# Patient Record
Sex: MALE | Race: WHITE | HISPANIC OR LATINO | Employment: PART TIME | URBAN - METROPOLITAN AREA
[De-identification: names, ages, dates, MRNs, and addresses within clinical notes are randomized per-mention and may not be internally consistent; named-entity substitution may affect disease eponyms.]

---

## 2017-01-05 ENCOUNTER — HOSPITAL ENCOUNTER (EMERGENCY)
Facility: CLINIC | Age: 42
Discharge: HOME | End: 2017-01-06
Attending: EMERGENCY MEDICINE | Admitting: EMERGENCY MEDICINE
Payer: COMMERCIAL

## 2017-01-05 DIAGNOSIS — G51.0 BELL'S PALSY: ICD-10-CM

## 2017-01-05 LAB
ERYTHROCYTE [DISTWIDTH] IN BLOOD BY AUTOMATED COUNT: 12.7 % (ref 10–15)
HCT VFR BLD AUTO: 44 % (ref 40–53)
HGB BLD-MCNC: 15.9 G/DL (ref 13.3–17.7)
MCH RBC QN AUTO: 30.6 PG (ref 26.5–33)
MCHC RBC AUTO-ENTMCNC: 36.1 G/DL (ref 31.5–36.5)
MCV RBC AUTO: 85 FL (ref 78–100)
PLATELET # BLD AUTO: 223 10E9/L (ref 150–450)
RBC # BLD AUTO: 5.19 10E12/L (ref 4.4–5.9)
WBC # BLD AUTO: 7.8 10E9/L (ref 4–11)

## 2017-01-05 PROCEDURE — 93005 ELECTROCARDIOGRAM TRACING: CPT

## 2017-01-05 PROCEDURE — 85027 COMPLETE CBC AUTOMATED: CPT | Performed by: EMERGENCY MEDICINE

## 2017-01-05 PROCEDURE — 80048 BASIC METABOLIC PNL TOTAL CA: CPT | Performed by: EMERGENCY MEDICINE

## 2017-01-05 PROCEDURE — 99285 EMERGENCY DEPT VISIT HI MDM: CPT | Mod: 25

## 2017-01-05 PROCEDURE — 25000125 ZZHC RX 250: Performed by: EMERGENCY MEDICINE

## 2017-01-05 PROCEDURE — 96361 HYDRATE IV INFUSION ADD-ON: CPT

## 2017-01-05 PROCEDURE — 25000128 H RX IP 250 OP 636: Performed by: EMERGENCY MEDICINE

## 2017-01-05 PROCEDURE — 96375 TX/PRO/DX INJ NEW DRUG ADDON: CPT

## 2017-01-05 PROCEDURE — 96374 THER/PROPH/DIAG INJ IV PUSH: CPT | Mod: 59

## 2017-01-05 RX ORDER — DIPHENHYDRAMINE HYDROCHLORIDE 50 MG/ML
25 INJECTION INTRAMUSCULAR; INTRAVENOUS ONCE
Status: COMPLETED | OUTPATIENT
Start: 2017-01-05 | End: 2017-01-05

## 2017-01-05 RX ORDER — METOCLOPRAMIDE HYDROCHLORIDE 5 MG/ML
10 INJECTION INTRAMUSCULAR; INTRAVENOUS ONCE
Status: COMPLETED | OUTPATIENT
Start: 2017-01-05 | End: 2017-01-05

## 2017-01-05 RX ADMIN — METOCLOPRAMIDE 10 MG: 5 INJECTION, SOLUTION INTRAMUSCULAR; INTRAVENOUS at 23:55

## 2017-01-05 RX ADMIN — DIPHENHYDRAMINE HYDROCHLORIDE 25 MG: 50 INJECTION, SOLUTION INTRAMUSCULAR; INTRAVENOUS at 23:55

## 2017-01-05 RX ADMIN — SODIUM CHLORIDE 500 ML: 9 INJECTION, SOLUTION INTRAVENOUS at 23:47

## 2017-01-05 ASSESSMENT — ENCOUNTER SYMPTOMS
PHOTOPHOBIA: 0
VOMITING: 0
NAUSEA: 0
SHORTNESS OF BREATH: 0
CONFUSION: 0
FACIAL ASYMMETRY: 1
HEADACHES: 1
FEVER: 0
SPEECH DIFFICULTY: 0
LIGHT-HEADEDNESS: 0

## 2017-01-05 NOTE — ED AVS SNAPSHOT
Emergency Department    [address] [place] [address]    Phone:  [phone]    Fax:  [phone]                                       Lars Daley   MRN: [id number]    Department:   Emergency Department   Date of Visit:  1/5/2017           After Visit Summary Signature Page     I have received my discharge instructions, and my questions have been answered. I have discussed any challenges I see with this plan with the nurse or doctor.    ..........................................................................................................................................  Patient/Patient Representative Signature      ..........................................................................................................................................  Patient Representative Print Name and Relationship to Patient    ..................................................               ................................................  Date                                            Time    ..........................................................................................................................................  Reviewed by Signature/Title    ...................................................              ..............................................  Date                                                            Time

## 2017-01-05 NOTE — ED AVS SNAPSHOT
Emergency Department    [address] [facility] [address]    Phone:  [phone]    Fax:  [phone]                                       Lars Daley   MRN: [id number]    Department:   Emergency Department   Date of Visit:  1/5/2017           Patient Information     Date Of Birth          1975        Your diagnoses for this visit were:     Bell's palsy        You were seen by Radha Jacobs MD.      Follow-up Information     Schedule an appointment as soon as possible for a visit with Neurology, [facility].    Contact information:    [address]  [address]  [facility] [id number] [phone]          Follow up with  Emergency Department.    Specialty:  EMERGENCY MEDICINE    Why:  If symptoms worsen    Contact information:    [address] [facility] [address] [phone]        Discharge Instructions       There was no stroke, tumor or bleeding on your MRI  Start taking the prednisone and acyclovir as prescribed till they are gone  Recommend following up with neurology about this. This will hopefully go away with time  Your blood sugars may increase while on the prednisone. You can take an extra 500 mg of metformin per day so total of 2500 mg per day (you are taking I think 2000 mg per day). If your blood sugars are still high, call your primary care provider for further adjustments  Use artificial tears on the right to keep it moist  You may need to tape eye shut or put patch on right eye to avoid your eye getting scratched if you find that your right eyelid can't shut all of the way    Parálisis De Wick [Wick's Palsy]  La parálisis de Wick es un problema que afecta el nervio que controla los músculos de un lado de la tootie. La causa es desconocida, vivek puede estar relacionada con la inflamación de dicho nervio. La mayoría de las personas con rubén problema se recuperan completamente en 3-6 meses.    Los síntomas en el lado afectado de la tootie  pueden incluir los siguientes: incapacidad de cerrar el párpado superior, exceso de lágrimas, tootie  caída  y boca asimétrica, babeo, entumecimiento o dolor en la tootie, cambios en el sentido del gusto (sabor) y sensibilidad excesiva al uli.  El mayor problema es la posibilidad de lesiones en el mohan. Ya que usted no puede pestañear normalmente, debe proteger el mohan de las partículas del polvo, el viento, etc. Además, puesto que las lágrimas no pueden lubricar el mohan sin pestañear, existe el riesgo de que la córnea (la parte anterior del mohan a través de la cual se ve el iris) se seque y dé lugar a felix úlcera. [place] podría afectar la visión de forma permanente.  Cuidados En [place]:  1. Use lágrimas artificiales (Artificial Tears) con frecuencia ashley el día y al acostarse para prevenir el resecamiento. Estas gotas están disponibles sin receta en las farmacias.  2. Use gafas protectoras, especialmente en el exterior, para protegerse de las partículas en movimiento en el aire.  3. Al acostarse, cierre el párpado con cinta adhesiva de papel (disponible en las farmacias). [place] tipo de cinta tiene un adhesivo muy suave para evitar lesionar el párpado. [place] le ayudará a protegerse de las lesiones en el mohan mientras duerme.  4. Algunas veces se recetan medicamentos para reducir la inflamación o para tratar infecciones virales específicas en el nervio. Si le recetan medicamentos, tómelos siguiendo exactamente las indicaciones que le hayan dado.  Programe felix VISITA DE CONTROL con chambers médico o con un otorrinolaringólogo en las próximas dos semanas.  Busque Prontamente Atención Médica  si algo de lo siguiente ocurre:    Enrojecimiento del mohan o secreción de pus en el mohan.    Cambios en la visión o dolor en el mohan    Aparición de dolor de corinne, dolor de blane, fiebre u otros síntomas sin causa aparente.    Dificultad para hablar o caminar    Debilidad en un brazo o felix pierna    [id number] [employer] [address]  [facility], [place] [address]. Todos los derechos reservados. Esta información no pretende sustituir la atención médica profesional. Sólo chambers médico puede diagnosticar y tratar un problema de mk.          24 Hour Appointment Hotline       To make an appointment at any [facility] clinic, call [phone] ([phone]). If you don't have a family doctor or clinic, we will help you find one. [facility] clinics are conveniently located to serve the needs of you and your family.             Review of your medicines      START taking        Dose / Directions Last dose taken    acyclovir 400 MG tablet   Commonly known as:  ZOVIRAX   Dose:  400 mg   Quantity:  35 tablet        Take 1 tablet (400 mg) by mouth 5 times daily for 7 days   Refills:  0        polyvinyl alcohol 1.4 % ophthalmic solution   Commonly known as:  LIQUIFILM TEARS   Dose:  1 drop   Quantity:  15 mL        Place 1 drop into the right eye as needed for dry eyes   Refills:  0        predniSONE 20 MG tablet   Commonly known as:  DELTASONE   Quantity:  15 tablet        Take three tablets (= 60mg) each day for 5 (five) days   Refills:  0          Our records show that you are taking the medicines listed below. If these are incorrect, please call your family doctor or clinic.        Dose / Directions Last dose taken    aspirin 81 MG tablet   Dose:  81 mg   Quantity:  100 tablet        Take 1 tablet (81 mg) by mouth daily   Refills:  3        atorvastatin 10 MG tablet   Commonly known as:  LIPITOR   Dose:  10 mg   Quantity:  90 tablet        Take 1 tablet (10 mg) by mouth daily   Refills:  1        HARMEET CONTOUR test strip   Quantity:  100 strip   Generic drug:  blood glucose monitoring        Use to test blood sugars twice daily as directed.   Refills:  PRN        blood glucose monitoring lancets   Quantity:  100 each        Use to test blood sugars twice daily as directed.   Refills:  PRN        glimepiride 4 MG tablet   Commonly known as:  AMARYL    Dose:  4 mg   Quantity:  90 tablet        Take 1 tablet (4 mg) by mouth daily   Refills:  1        lisinopril 2.5 MG tablet   Commonly known as:  PRINIVIL/Zestril   Dose:  2.5 mg   Quantity:  90 tablet        Take 1 tablet (2.5 mg) by mouth daily   Refills:  1        metFORMIN 500 MG 24 hr tablet   Commonly known as:  GLUCOPHAGE-XR   Dose:  1000 mg   Quantity:  360 tablet        Take 2 tablets (1,000 mg) by mouth 2 times daily   Refills:  1                Prescriptions were sent or printed at these locations (3 Prescriptions)                   Other Prescriptions                Printed at Department/Unit printer (3 of 3)         predniSONE (DELTASONE) 20 MG tablet               acyclovir (ZOVIRAX) 400 MG tablet               polyvinyl alcohol (LIQUIFILM TEARS) 1.4 % ophthalmic solution                Procedures and tests performed during your visit     Basic metabolic panel    CBC (+ platelets, no diff)    EKG 12 lead    MR Brain w/o & w Contrast    MR Head w/o Contrast Angiogram    MR Neck w/o & w Contrast Angiogram      Orders Needing Specimen Collection     None      Pending Results     Date and Time Order Name Status Description    1/5/2017 2339 MR Neck w/o & w Contrast Angiogram In process     1/5/2017 2339 MR Head w/o Contrast Angiogram In process     1/5/2017 2339 MR Brain w/o & w Contrast In process             Pending Culture Results     No orders found for last 2 day(s).       Test Results from your hospital stay           1/6/2017 12:00 AM - Interface, [employer] Results      Component Results     Component Value Ref Range & Units Status    Sodium 139 133 - 144 mmol/L Final    Potassium 3.5 3.4 - 5.3 mmol/L Final    Chloride 103 94 - 109 mmol/L Final    Carbon Dioxide 27 20 - 32 mmol/L Final    Anion Gap 9 3 - 14 mmol/L Final    Glucose 181 (H) 70 - 99 mg/dL Final    Urea Nitrogen 15 7 - 30 mg/dL Final    Creatinine 0.69 0.66 - 1.25 mg/dL Final    GFR Estimate >90  Non  GFR Calc   >60  mL/min/1.7m2 Final    GFR Estimate If Black >90   GFR Calc   >60 mL/min/1.7m2 Final    Calcium 9.1 8.5 - 10.1 mg/dL Final         1/5/2017 11:47 PM - Interface, [employer] Results      Component Results     Component Value Ref Range & Units Status    WBC 7.8 4.0 - 11.0 10e9/L Final    RBC Count 5.19 4.4 - 5.9 10e12/L Final    Hemoglobin 15.9 13.3 - 17.7 g/dL Final    Hematocrit 44.0 40.0 - 53.0 % Final    MCV 85 78 - 100 fl Final    MCH 30.6 26.5 - 33.0 pg Final    MCHC 36.1 31.5 - 36.5 g/dL Final    RDW 12.7 10.0 - 15.0 % Final    Platelet Count 223 150 - 450 10e9/L Final         1/6/2017  1:52 AM - Thelma De La Cruz         1/6/2017  1:41 AM - Thelma De La Cruz         1/6/2017  2:27 AM - Thelma De La Cruz                Clinical Quality Measure: Blood Pressure Screening     Your blood pressure was checked while you were in the emergency department today. The last reading we obtained was  BP: (!) 132/101 mmHg . Please read the guidelines below about what these numbers mean and what you should do about them.  If your systolic blood pressure (the top number) is less than 120 and your diastolic blood pressure (the bottom number) is less than 80, then your blood pressure is normal. There is nothing more that you need to do about it.  If your systolic blood pressure (the top number) is 120-139 or your diastolic blood pressure (the bottom number) is 80-89, your blood pressure may be higher than it should be. You should have your blood pressure rechecked within a year by a primary care provider.  If your systolic blood pressure (the top number) is 140 or greater or your diastolic blood pressure (the bottom number) is 90 or greater, you may have high blood pressure. High blood pressure is treatable, but if left untreated over time it can put you at risk for heart attack, stroke, or kidney failure. You should have your blood pressure rechecked by a primary care provider within the next 4 weeks.  If your  "provider in the emergency department today gave you specific instructions to follow-up with your doctor or provider even sooner than that, you should follow that instruction and not wait for up to 4 weeks for your follow-up visit.        Thank you for choosing [facility]       Thank you for choosing [facility] for your care. Our goal is always to provide you with excellent care. Hearing back from our patients is one way we can continue to improve our services. Please take a few minutes to complete the written survey that you may receive in the mail after you visit with us. Thank you!        [employer] Information     [employer] lets you send messages to your doctor, view your test results, renew your prescriptions, schedule appointments and more. To sign up, go to [email or web address] . Click on \"Log in\" on the left side of the screen, which will take you to the Welcome page. Then click on \"Sign up Now\" on the right side of the page.     You will be asked to enter the access code listed below, as well as some personal information. Please follow the directions to create your username and password.     Your access code is: [email or web address]  Expires: 2017  4:32 AM     Your access code will  in 90 days. If you need help or a new code, please call your [facility] clinic or [phone].        Care EveryWhere ID     This is your Care EveryWhere ID. This could be used by other organizations to access your [facility] medical records  [id number]        After Visit Summary       This is your record. Keep this with you and show to your community pharmacist(s) and doctor(s) at your next visit.                  "

## 2017-01-06 ENCOUNTER — APPOINTMENT (OUTPATIENT)
Dept: MRI IMAGING | Facility: CLINIC | Age: 42
End: 2017-01-06
Attending: EMERGENCY MEDICINE
Payer: COMMERCIAL

## 2017-01-06 VITALS
WEIGHT: 174 LBS | HEIGHT: 66 IN | TEMPERATURE: 97.8 F | BODY MASS INDEX: 27.97 KG/M2 | DIASTOLIC BLOOD PRESSURE: 105 MMHG | SYSTOLIC BLOOD PRESSURE: 148 MMHG | RESPIRATION RATE: 14 BRPM | OXYGEN SATURATION: 97 % | HEART RATE: 93 BPM

## 2017-01-06 LAB
ANION GAP SERPL CALCULATED.3IONS-SCNC: 9 MMOL/L (ref 3–14)
BUN SERPL-MCNC: 15 MG/DL (ref 7–30)
CALCIUM SERPL-MCNC: 9.1 MG/DL (ref 8.5–10.1)
CHLORIDE SERPL-SCNC: 103 MMOL/L (ref 94–109)
CO2 SERPL-SCNC: 27 MMOL/L (ref 20–32)
CREAT SERPL-MCNC: 0.69 MG/DL (ref 0.66–1.25)
GFR SERPL CREATININE-BSD FRML MDRD: ABNORMAL ML/MIN/1.7M2
GLUCOSE SERPL-MCNC: 181 MG/DL (ref 70–99)
INTERPRETATION ECG - MUSE: NORMAL
POTASSIUM SERPL-SCNC: 3.5 MMOL/L (ref 3.4–5.3)
SODIUM SERPL-SCNC: 139 MMOL/L (ref 133–144)

## 2017-01-06 PROCEDURE — 25500045 ZZH RX 255: Performed by: EMERGENCY MEDICINE

## 2017-01-06 PROCEDURE — A9585 GADOBUTROL INJECTION: HCPCS | Performed by: EMERGENCY MEDICINE

## 2017-01-06 PROCEDURE — 70549 MR ANGIOGRAPH NECK W/O&W/DYE: CPT

## 2017-01-06 PROCEDURE — 70553 MRI BRAIN STEM W/O & W/DYE: CPT

## 2017-01-06 PROCEDURE — 70544 MR ANGIOGRAPHY HEAD W/O DYE: CPT | Mod: XS

## 2017-01-06 RX ORDER — POLYVINYL ALCOHOL 14 MG/ML
1 SOLUTION/ DROPS OPHTHALMIC PRN
Qty: 15 ML | Refills: 0 | Status: SHIPPED | OUTPATIENT
Start: 2017-01-06 | End: 2023-09-18

## 2017-01-06 RX ORDER — GADOBUTROL 604.72 MG/ML
10 INJECTION INTRAVENOUS ONCE
Status: COMPLETED | OUTPATIENT
Start: 2017-01-06 | End: 2017-01-06

## 2017-01-06 RX ORDER — PREDNISONE 20 MG/1
TABLET ORAL
Qty: 15 TABLET | Refills: 0 | Status: SHIPPED | OUTPATIENT
Start: 2017-01-06 | End: 2019-01-14

## 2017-01-06 RX ORDER — ACYCLOVIR 400 MG/1
400 TABLET ORAL
Qty: 35 TABLET | Refills: 0 | Status: SHIPPED | OUTPATIENT
Start: 2017-01-06 | End: 2023-09-18

## 2017-01-06 RX ADMIN — GADOBUTROL 10 ML: 604.72 INJECTION INTRAVENOUS at 03:22

## 2017-01-06 ASSESSMENT — ENCOUNTER SYMPTOMS
NUMBNESS: 1
WEAKNESS: 1

## 2017-01-06 NOTE — DISCHARGE INSTRUCTIONS
There was no stroke, tumor or bleeding on your MRI  Start taking the prednisone and acyclovir as prescribed till they are gone  Recommend following up with neurology about this. This will hopefully go away with time  Your blood sugars may increase while on the prednisone. You can take an extra 500 mg of metformin per day so total of 2500 mg per day (you are taking I think 2000 mg per day). If your blood sugars are still high, call your primary care provider for further adjustments  Use artificial tears on the right to keep it moist  You may need to tape eye shut or put patch on right eye to avoid your eye getting scratched if you find that your right eyelid can't shut all of the way    Parálisis De Wick [Wick's Palsy]  La parálisis de Wick es un problema que afecta el nervio que controla los músculos de un lado de la tootie. La causa es desconocida, vivek puede estar relacionada con la inflamación de dicho nervio. La mayoría de las personas con rubén problema se recuperan completamente en 3-6 meses.    Los síntomas en el lado afectado de la tootie pueden incluir los siguientes: incapacidad de cerrar el párpado superior, exceso de lágrimas, tootie  caída  y boca asimétrica, babeo, entumecimiento o dolor en la tootie, cambios en el sentido del gusto (sabor) y sensibilidad excesiva al uli.  El mayor problema es la posibilidad de lesiones en el mohan. Ya que usted no puede pestañear normalmente, debe proteger el mohan de las partículas del polvo, el viento, etc. Además, puesto que las lágrimas no pueden lubricar el mohan sin pestañear, existe el riesgo de que la córnea (la parte anterior del mohan a través de la cual se ve el iris) se seque y dé lugar a felix úlcera. [place] podría afectar la visión de forma permanente.  Cuidados En [place]:  1. Use lágrimas artificiales (Artificial Tears) con frecuencia ashley el día y al acostarse para prevenir el resecamiento. Estas gotas están disponibles sin receta en las farmacias.  2. Use gafas  protectoras, especialmente en el exterior, para protegerse de las partículas en movimiento en el aire.  3. Al acostarse, cierre el párpado con cinta adhesiva de papel (disponible en las farmacias). [place] tipo de cinta tiene un adhesivo muy suave para evitar lesionar el párpado. [place] le ayudará a protegerse de las lesiones en el mohan mientras duerme.  4. Algunas veces se recetan medicamentos para reducir la inflamación o para tratar infecciones virales específicas en el nervio. Si le recetan medicamentos, tómelos siguiendo exactamente las indicaciones que le hayan dado.  Programe felix VISITA DE CONTROL con chambers médico o con un otorrinolaringólogo en las próximas dos semanas.  Busque Prontamente Atención Médica  si algo de lo siguiente ocurre:    Enrojecimiento del mohan o secreción de pus en el mohan.    Cambios en la visión o dolor en el mohan    Aparición de dolor de corinne, dolor de blane, fiebre u otros síntomas sin causa aparente.    Dificultad para hablar o caminar    Debilidad en un brazo o felix pierna    [phone] [employer]. [address] [place] [address]. Todos los derechos reservados. Esta información no pretende sustituir la atención médica profesional. Sólo chambers médico puede diagnosticar y tratar un problema de mk.

## 2017-01-06 NOTE — ED NOTES
Bed: ED05  Expected date:   Expected time:   Means of arrival:   Comments:  Headache facial numbess triage

## 2017-01-06 NOTE — ED PROVIDER NOTES
History     Chief Complaint:  Facial Numbness and Headache    HPI   Lars Daley is a 41 year old male not on blood thinners with a history of type 2 diabetes and headaches who presents with facial numbness/weakness and a headache. The patient reports that he first developed a headache yesterday afternoon that worsened a little bit this morning. No sudden onset of headache. He states he started to feel a funny feeling in his face, so he looked in the mirror around 6pm today and noticed some right sided facial weakness. He states he is not sure exactly what time these symptoms started. The patient states the right side of his mouth was weak, numb, and his tongue felt tingly. He also notes he had some right eye twitching earlier in the day. The patient was worried about these symptoms, prompting him to come to the ED for evaluation. On arrival to the ED, the patient states the right side of his face still feels funny and his tongue is still tingly. He reports that he can't move the right side of his face as well as the left. He reports he has a headache, though does note he gets headaches roughly once a week. This headache is mainly on the right side of his head and he thinks it is similar to his normal headaches. The patient took some Aspirin without much relief. He notes the facial numbness, weakness, and tingling in his tongue but denies any other numbness or weakness. He denies any speech or vision changes, light-headedness, dizziness, nausea, or vomiting. No arm/leg weakness or numbness. He denies any recent falls or trauma and does not have a history of stroke.     Allergies:  No known drug allergies     Medications:    Metformin  Glimepiride  Lisinopril  Atorvastatin  Aspirin 81 mg     Past Medical History:    Depression  Hyperlipidemia  Type 2 diabetes   Tension headaches    Past Surgical History:    History reviewed. No pertinent surgical history.    Family History:    Diabetes     Social History:  Smoking  "status: No  Alcohol use: Yes  Presents to the ED with his wife and child   Marital Status:   [2]    Review of Systems   Constitutional: Negative for fever.   HENT: Negative for hearing loss.    Eyes: Negative for photophobia and visual disturbance.   Respiratory: Negative for shortness of breath.    Gastrointestinal: Negative for nausea and vomiting.   Neurological: Positive for facial asymmetry, weakness, numbness and headaches. Negative for syncope, speech difficulty and light-headedness.        Tongue tingling    Psychiatric/Behavioral: Negative for confusion.   All other systems reviewed and are negative.      Physical Exam   Patient Vitals for the past 24 hrs:   BP Temp Temp src Pulse Resp SpO2 Height Weight   01/06/17 0430 (!) 148/105 mmHg - - - - 97 % - -   01/06/17 0100 130/74 mmHg - - - - 95 % - -   01/06/17 0030 132/76 mmHg - - - 18 95 % - -   01/06/17 0000 (!) 152/92 mmHg - - - 16 96 % - -   01/05/17 2254 (!) 179/105 mmHg 97.8  F (36.6  C) Temporal 93 16 99 % 1.676 m (5' 6\") 78.926 kg (174 lb)       Physical Exam   General: Resting comfortably on the gurney  Eyes:  The pupils are equal and round    EOMI  ENT:    The nose is normal    Pinnae are normal    The oropharynx is clear    TM clear bilaterally    No herpetic lesions on face, ears  Neck:  Normal range of motion    No neck stiffness  CV:  Regular rate and rhythm    Skin warm and well perfused   Resp:  Lungs are clear    Non-labored    No rales    No wheezing   GI:  Abdomen is soft, there is no rigidity    No distension    No rebound tenderness     No abdominal tenderness  MS:  Normal muscular tone  Skin:  No rash or acute skin lesions noted  NEURO: Awake, alert    Speech is normal and fluent    Mild right mouth droop and slightly decreased strength in right eye closure    Able to close right eye fully    Able to raise both eyebrows and it appears symmetric though patient reports that he can't raise eyebrow   on right side as well as " left    Moves all extremities equally with no arm drift    Normal finger-nose-finger     strength equal bilaterally    Equal sensation bilaterally on UE/LE    No arm or leg drift    SILT on bilateral face    Gait stable     Oriented x3  Psych:  Normal affect.  Appropriate interactions.    Emergency Department Course   ECG (23:46:50):  Rate 79 bpm. MA interval 146. QRS duration 90. QT/QTc 382/438. P-R-T axes 58 17 26. Normal sinus rhythm. Normal ECG   Interpreted at 2356 by Radha Jacobs MD.    Imaging:  Radiographic findings were communicated with the patient who voiced understanding of the findings.    MR Brain w/o and w contrast  No acute infarct, mass, mass effect, or hemorrhage  As read by Radiology.    MRA Head w/o contrast  Normal intracranial circulation  As read by Radiology.    MRA Neck w/o and w contrast  No hemodynamically significant narrowing throughout the major neck vessels.   As read by Radiology.    Laboratory:  CBC: WNL (WBC 7.8, HGB 15.9, )   BMP: Glucose 181(H), o/w WNL (Creatinine 0.69)    Interventions:  2347: NS 1L IV Bolus  2355: Reglan 10 mg IV  2355: Benadryl 25 mg IV    Emergency Department Course:  Past medical records, nursing notes, and vitals reviewed.  2323: I performed an exam of the patient and obtained history, as documented above.  IV inserted and blood drawn.  ECG ordered, results above.     0022: I rechecked the patient. Headache is improved.     The patient was sent for a MRI/MRA while in the emergency department, findings above.  0406: I rechecked the patient. Explained findings to the patient.    I rechecked the patient.  Findings and plan explained to the Patient. Patient discharged home with instructions regarding supportive care, medications, and reasons to return. The importance of close follow-up was reviewed.     Impression & Plan      Medical Decision Making:  Lars Daley is a 41 year old male who presented to the Emergency Department with numbness  and headache. Vital signs noted for hypertension on arrival to the Emergency Department. On exam, he has slight right mouth droop and slightly decreased strength on eye closure on right. He feels that he cannot raise his right eyebrow as well as the left side, though I cannot appreciate this. Also reports a funny sensation by his mouth and a mild right sided headache. He had some eye twitching earlier today before the weakness started. Unclear exactly what time this all started. He has no other neuro deficits. Concern for CVA versus Bell's palsy. Given that he is able to raise the right side of his eyebrow, did pursue MRI/MRA to rule out CVA though history/exam does seem consistent with Bell's palsy.This was negative for stroke, aneurysm, bleed, tumor, or other acute pathology. His headache resolved after treatment in the Emergency Department. He does have almost weekly headaches and this is similar, so I do not think he needs additional work up for the headache. No fever, neck stiffness and overall well appearing and I do not think meningitis is likely. Given normal MRI, I suspect Bell's palsy causing his symptoms. I discussed the natural course of Bell's palsy and recommended Prednisone and Acyclovir. He has no tick exposure to suggest Lyme's. He does have a history of diabetes and so did caution him that the prednisone may increase his blood sugars and suggested that he could take one extra pill of Metformin per day if his sugars do end up being high and if still high, recommended following up with his primary care provider regarding this. Recommend follow up with neurology for the Bell's. Cautioned him about his right eye and recommended artificial tears and potentially may need to tape his eye shut at night or wear patch though he can close it well on his own. Reasons to return to the Emergency Department were discussed with the patient.     Diagnosis:    ICD-10-CM   1. Bell's palsy G51.0     Disposition:  Discharged to home    Discharge Medications:   Details   predniSONE (DELTASONE) 20 MG tablet Take three tablets (= 60mg) each day for 5 (five) days, Disp-15 tablet, R-0, Local Print      acyclovir (ZOVIRAX) 400 MG tablet Take 1 tablet (400 mg) by mouth 5 times daily for 7 days, Disp-35 tablet, R-0, Local Print      polyvinyl alcohol (LIQUIFILM TEARS) 1.4 % ophthalmic solution Place 1 drop into the right eye as needed for dry eyes, Disp-15 mL, R-0, Local Print     Beba Molina  1/5/2017    EMERGENCY DEPARTMENT    I, Beba Molina, am serving as a scribe at 11:23 PM on 1/5/2017 to document services personally performed by Radha Jacobs MD based on my observations and the provider's statements to me.       Radha Jacobs MD  01/06/17 0519

## 2017-01-10 ENCOUNTER — OFFICE VISIT (OUTPATIENT)
Dept: FAMILY MEDICINE | Facility: CLINIC | Age: 42
End: 2017-01-10
Payer: COMMERCIAL

## 2017-01-10 VITALS
DIASTOLIC BLOOD PRESSURE: 86 MMHG | HEIGHT: 66 IN | SYSTOLIC BLOOD PRESSURE: 126 MMHG | BODY MASS INDEX: 27.14 KG/M2 | TEMPERATURE: 96.7 F | WEIGHT: 168.9 LBS | OXYGEN SATURATION: 97 % | HEART RATE: 84 BPM

## 2017-01-10 DIAGNOSIS — E11.9 TYPE 2 DIABETES MELLITUS WITHOUT COMPLICATION, WITHOUT LONG-TERM CURRENT USE OF INSULIN (H): ICD-10-CM

## 2017-01-10 DIAGNOSIS — G51.0 BELL'S PALSY: Primary | ICD-10-CM

## 2017-01-10 DIAGNOSIS — R80.9 MICROALBUMINURIA: ICD-10-CM

## 2017-01-10 PROCEDURE — 99214 OFFICE O/P EST MOD 30 MIN: CPT | Performed by: FAMILY MEDICINE

## 2017-01-10 RX ORDER — LISINOPRIL 2.5 MG/1
2.5 TABLET ORAL DAILY
Qty: 90 TABLET | Refills: 0 | Status: SHIPPED | OUTPATIENT
Start: 2017-01-10 | End: 2018-01-16

## 2017-01-10 NOTE — MR AVS SNAPSHOT
"              After Visit Summary   1/10/2017    Lars Daley    MRN: [id number]           Patient Information     Date Of Birth          1975        Visit Information        Provider Department      1/10/2017 2:30 PM Mago Pantoja MD [facility]        Today's Diagnoses     Microalbuminuria    -  1     Bell's palsy - right face         Type 2 diabetes mellitus without complication, without long-term current use of insulin (H)           Care Instructions    Artificial tears are available without a prescription in liquid, gel, and ointment forms (see \"Dry eyes\", section on 'Artificial tears'). Liquid or gel formulations of artificial tears should be applied every hour while the patient is awake, and ointment formulations (eg, Soothe), which contain mineral oil and white petrolatum, should be used at night [21]. Protective glasses or goggles should be prescribed. Patches can be used at night, but tape should not be placed directly on the eyelid since the patch could slip and abrade the cornea. Rarely, tarsorrhaphy or temporary implantation of a gold weight into the upper lid is required.    Plan-   Eye care as above- use drop every 2-4 hours during day while awake.  At night, use ointment (like Soothe) to keep eyes moist- very important.  Can try eye patch with gauze under if needed or can use the tape from ER.    Call neurology for appointment.  Follow-up here for diabetes appointment after neurology appointment - come fasting.  If unable to see neurology within ~3 weeks, make appointment here in 3 weeks.        Follow-ups after your visit        Who to contact     If you have questions or need follow up information about today's clinic visit or your schedule please contact [facility] directly at [phone].  Normal or non-critical lab and imaging results will be communicated to you by MyChart, letter or phone within 4 business days after the clinic has received the results. If " "you do not hear from us within 7 days, please contact the clinic through jaeyos or phone. If you have a critical or abnormal lab result, we will notify you by phone as soon as possible.  Submit refill requests through jaeyos or call your pharmacy and they will forward the refill request to us. Please allow 3 business days for your refill to be completed.          Additional Information About Your Visit        [employer] Information     jaeyos lets you send messages to your doctor, view your test results, renew your prescriptions, schedule appointments and more. To sign up, go to [email or web address] . Click on \"Log in\" on the left side of the screen, which will take you to the Welcome page. Then click on \"Sign up Now\" on the right side of the page.     You will be asked to enter the access code listed below, as well as some personal information. Please follow the directions to create your username and password.     Your access code is: [email or web address]  Expires: 2017  4:32 AM     Your access code will  in 90 days. If you need help or a new code, please call your [facility] clinic or [phone].        Care EveryWhere ID     This is your Care EveryWhere ID. This could be used by other organizations to access your [facility] medical records  [id number]        Your Vitals Were     Pulse Temperature Height BMI (Body Mass Index) Pulse Oximetry       84 96.7  F (35.9  C) (Oral) 5' 6\" (1.676 m) 27.27 kg/m2 97%        Blood Pressure from Last 3 Encounters:   01/10/17 126/86   17 148/105   16 124/88    Weight from Last 3 Encounters:   01/10/17 168 lb 14.4 oz (76.613 kg)   17 174 lb (78.926 kg)   16 175 lb 11.2 oz (79.697 kg)              Today, you had the following     No orders found for display         Where to get your medicines      These medications were sent to [facility] - [place], [place] - [address] [place] [address] [address] [facility] " [address]     Phone:  [phone]    - blood glucose monitoring test strip  - lisinopril 2.5 MG tablet       Primary Care Provider Office Phone # Fax #    Mago Pantoja [phone]       [facility] [address]  [facility] [address]        Thank you!     Thank you for choosing [facility]  for your care. Our goal is always to provide you with excellent care. Hearing back from our patients is one way we can continue to improve our services. Please take a few minutes to complete the written survey that you may receive in the mail after your visit with us. Thank you!             Your Updated Medication List - Protect others around you: Learn how to safely use, store and throw away your medicines at www.disposemymeds.org.          This list is accurate as of: 1/10/17  3:40 PM.  Always use your most recent med list.                   Brand Name Dispense Instructions for use    acyclovir 400 MG tablet    ZOVIRAX    35 tablet    Take 1 tablet (400 mg) by mouth 5 times daily for 7 days       aspirin 81 MG tablet     100 tablet    Take 1 tablet (81 mg) by mouth daily       atorvastatin 10 MG tablet    LIPITOR    90 tablet    Take 1 tablet (10 mg) by mouth daily       blood glucose monitoring lancets     100 each    Use to test blood sugars twice daily as directed.       blood glucose monitoring test strip    HARMEET CONTOUR    100 strip    Use to test blood sugars twice daily as directed.       glimepiride 4 MG tablet    AMARYL    90 tablet    Take 1 tablet (4 mg) by mouth daily       lisinopril 2.5 MG tablet    PRINIVIL/Zestril    90 tablet    Take 1 tablet (2.5 mg) by mouth daily       metFORMIN 500 MG 24 hr tablet    GLUCOPHAGE-XR    360 tablet    Take 2 tablets (1,000 mg) by mouth 2 times daily       polyvinyl alcohol 1.4 % ophthalmic solution    LIQUIFILM TEARS    15 mL    Place 1 drop into the right eye as needed for dry eyes       predniSONE 20 MG tablet     DELTASONE    15 tablet    Take three tablets (= 60mg) each day for 5 (five) days

## 2017-01-10 NOTE — NURSING NOTE
"Chief Complaint   Patient presents with     Hospital F/U     /86 mmHg  Pulse 84  Temp(Src) 96.7  F (35.9  C) (Oral)  Ht 5' 6\" (1.676 m)  Wt 168 lb 14.4 oz (76.613 kg)  BMI 27.27 kg/m2  SpO2 97% Estimated body mass index is 27.27 kg/(m^2) as calculated from the following:    Height as of this encounter: 5' 6\" (1.676 m).    Weight as of this encounter: 168 lb 14.4 oz (76.613 kg).  bp completed using cuff size: large      Health Maintenance that is potentially due pending provider review:  NONE    n/a  Thelma Lux M.A.    "

## 2017-01-10 NOTE — PATIENT INSTRUCTIONS
"Artificial tears are available without a prescription in liquid, gel, and ointment forms (see \"Dry eyes\", section on 'Artificial tears'). Liquid or gel formulations of artificial tears should be applied every hour while the patient is awake, and ointment formulations (eg, Soothe), which contain mineral oil and white petrolatum, should be used at night [21]. Protective glasses or goggles should be prescribed. Patches can be used at night, but tape should not be placed directly on the eyelid since the patch could slip and abrade the cornea. Rarely, tarsorrhaphy or temporary implantation of a gold weight into the upper lid is required.    Plan-   Eye care as above- use drop every 2-4 hours during day while awake.  At night, use ointment (like Soothe) to keep eyes moist- very important.  Can try eye patch with gauze under if needed or can use the tape from ER.    Call neurology for appointment.  Follow-up here for diabetes appointment after neurology appointment - come fasting.  If unable to see neurology within ~3 weeks, make appointment here in 3 weeks.  "

## 2017-01-10 NOTE — PROGRESS NOTES
SUBJECTIVE:                                                    Lars Daley is a 41 year old male who presents to clinic today for the following health issues:      ED/UC Followup:    Facility:  [facility]  Date of visit: 01/05/17  Reason for visit: HA and right facial dropping, numbness  Current Status: still having some HA, also still has no improvement on facial drooping and numbness.  Imaging done to rule out CVA. Pt is prednisone, reports he has not checked blood sugars as he is out of strips.  He is also taking acyclovir 5 times daily.     HA sx's started last Wed, went to work, mild headache, similar to his usual headcaches.  Next day, little more headache, but still mild, still similar to his usual headaches.    Facial sx's started Thur- 5 days ago.  Thur- ~5pm, noticed decreased taste sensation, couldn't taste flavor in soda at work.  By 6:30pm, noticed lips, felt a bit bigger, but couldn't see anything in mirror.  By 7:30/8pm, usually whistles to music, and wasn't able to whistle.  Checked mirror again- still nothing.  Started to worry, getting scared.  9pm at end of shift, washed face/mouth, couldn't control the water in his mouth.  Was driving to his house, but was worried about a stroke, panicked.   Face was drooping by then, wife noticed it, and he looked really pale.  She took him to the ER.    At ER, got MRI of brain/neck and angiogram.  Negative.    Dx with Bell's Palsy, started on acyclovir and prednisone.  Can increase glucose readings, so said to take extra metformin - up to 2500mg/d.  Glucose reading later that night- 168.  Doesn't have any more strips.    Sx's now- mild headache, and right facial paralysis.   R eye feels fine- no dryness or irritation.      Patient Active Problem List   Diagnosis     Hyperlipidemia LDL goal <100     Type 2 diabetes mellitus without complication, without long-term current use of insulin (H)     Plantar warts     Tension headache     Microalbuminuria     Past  Surgical History   Procedure Laterality Date     Stress echo (metro)  11     normal       Social History   Substance Use Topics     Smoking status: Never Smoker      Smokeless tobacco: Never Used     Alcohol Use: 0.0 oz/week     0 Standard drinks or equivalent per week      Comment: socially     Family History   Problem Relation Age of Onset     HEART DISEASE Maternal Uncle 43     By pass surgery     DIABETES Mother 38     C.A.D. Maternal Uncle 49      of MI, no DM, no cigs         Current Outpatient Prescriptions   Medication Sig Dispense Refill     blood glucose monitoring (HARMEET CONTOUR) test strip Use to test blood sugars twice daily as directed. 100 strip PRN     lisinopril (PRINIVIL/ZESTRIL) 2.5 MG tablet Take 1 tablet (2.5 mg) by mouth daily 90 tablet 0     predniSONE (DELTASONE) 20 MG tablet Take three tablets (= 60mg) each day for 5 (five) days 15 tablet 0     acyclovir (ZOVIRAX) 400 MG tablet Take 1 tablet (400 mg) by mouth 5 times daily for 7 days 35 tablet 0     polyvinyl alcohol (LIQUIFILM TEARS) 1.4 % ophthalmic solution Place 1 drop into the right eye as needed for dry eyes 15 mL 0     metFORMIN (GLUCOPHAGE-XR) 500 MG 24 hr tablet Take 2 tablets (1,000 mg) by mouth 2 times daily 360 tablet 1     glimepiride (AMARYL) 4 MG tablet Take 1 tablet (4 mg) by mouth daily 90 tablet 1     atorvastatin (LIPITOR) 10 MG tablet Take 1 tablet (10 mg) by mouth daily 90 tablet 1     aspirin 81 MG tablet Take 1 tablet (81 mg) by mouth daily 100 tablet 3     Lancets (MICROLET) MISC Use to test blood sugars twice daily as directed. 100 each PRN     [DISCONTINUED] lisinopril (PRINIVIL,ZESTRIL) 2.5 MG tablet Take 1 tablet (2.5 mg) by mouth daily 90 tablet 1     Allergies   Allergen Reactions     Nkda [No Known Drug Allergies]      Recent Labs   Lab Test  17   2320  16   1541  16   1039  09/23/15   1017  03/11/15   1202  01/09/15   1621  14   0906   02/10/14   0848   A1C   --   7.9*  7.9*   "9.0*   --   9.4*  7.8*   --   8.1*   LDL   --    --    --   108   --    --   67   --   103   HDL   --    --    --   34*   --    --   31*   --   29*   TRIG   --    --    --   180*   --    --   283*   --   344*   ALT   --    --    --   48  56  82*   --    --    --    CR  0.69   --    --   0.68  0.69  0.76   --    < >  0.70   GFRESTIMATED  >90  Non  GFR Calc     --    --   >90  Non  GFR Calc    >90  Non  GFR Calc    >90  Non  GFR Calc     --    < >  >90   GFRESTBLACK  >90   GFR Calc     --    --   >90   GFR Calc    >90   GFR Calc    >90   GFR Calc     --    < >  >90   POTASSIUM  3.5   --    --   4.0  3.9  4.6   --    < >   --    TSH   --   1.13   --   1.33   --    --   1.99   --    --     < > = values in this interval not displayed.      BP Readings from Last 3 Encounters:   01/10/17 126/86   01/06/17 148/105   09/16/16 124/88    Wt Readings from Last 3 Encounters:   01/10/17 168 lb 14.4 oz (76.613 kg)   01/05/17 174 lb (78.926 kg)   09/16/16 175 lb 11.2 oz (79.697 kg)            Labs reviewed in EPIC  Problem list, Medication list, Allergies, and Medical/Social/Surgical histories reviewed in [facility] and updated as appropriate.    ROS:  Constitutional, HEENT, cardiovascular, pulmonary, gi and gu systems are negative, except as otherwise noted.    OBJECTIVE:                                                    /86 mmHg  Pulse 84  Temp(Src) 96.7  F (35.9  C) (Oral)  Ht 5' 6\" (1.676 m)  Wt 168 lb 14.4 oz (76.613 kg)  BMI 27.27 kg/m2  SpO2 97%  Body mass index is 27.27 kg/(m^2).  GENERAL: pleasant, alert and no distress, obvious facial paralysis on right, no wrinkling of right forehead, mouth drooping on right, not blinking right eye  EYES: Eyes grossly normal to inspection, PERRL and conjunctivae and sclerae normal, able to close R eyelids with effort  HENT: ear canals and TM's normal, " nose and mouth without ulcers or lesions  NECK: no adenopathy, no asymmetry, masses, or scars and thyroid normal to palpation  RESP: lungs clear to auscultation - no rales, rhonchi or wheezes  CV: regular rate and rhythm, normal S1 S2, no S3 or S4, no murmur, click or rub, no peripheral edema and peripheral pulses strong  ABDOMEN: soft, nontender, no hepatosplenomegaly, no masses and bowel sounds normal  MS: no gross musculoskeletal defects noted, no edema  SKIN: no suspicious lesions or rashes  NEURO: Normal strength and tone, mentation intact and speech normal, facial paralysis as above- unable to lift right eyebrow, unable to smile or pucker lips on right side, able to just close R eyelids with effort.  Normal sensation to both sides of face.  Palate rising symmetrically.  PSYCH: mentation appears normal, affect normal/bright    Diagnostic Test Results:  none      ASSESSMENT/PLAN:                                                        ICD-10-CM    1. Bell's palsy - right face G51.0    2. Type 2 diabetes mellitus without complication, without long-term current use of insulin (H) E11.9 blood glucose monitoring (HARMEET CONTOUR) test strip     lisinopril (PRINIVIL/ZESTRIL) 2.5 MG tablet   3. Microalbuminuria R80.9 lisinopril (PRINIVIL/ZESTRIL) 2.5 MG tablet     Bell's Palsy- sx's started 5 days ago.  Mod/severe sx's, able to close R eye, but only with significant effort.  Minimal movement of right side of forehead, cheek/mouth musculature or chin.  Pt evaluated at ER within hours of facial sx's, MRI/A's of head/neck negative, started on acyclovir and prednisone right away which is good.  Will continue on these.  Urged him to be very careful with his eye- drops q2-4 hours, ointment at night with tape.  Watch for improvement within 21 days- better prognosis if seeing some improvement within that time.  Efficacy of PT - studies mixed.  Pt will make appt with neurology- number given from ER.    F/u in 3 wks either here or  at neurology (combine with DM visit and fasting labs if here).  If able to see neurology first, f/u here for the DM/labs visit 1-3 wks later.  We have not checked fasting labs since 9/17, so overdue.  Pt agrees with plan.    Mago Pantoja MD  [facility]

## 2017-02-07 ENCOUNTER — TELEPHONE (OUTPATIENT)
Dept: FAMILY MEDICINE | Facility: CLINIC | Age: 42
End: 2017-02-07

## 2017-02-07 NOTE — TELEPHONE ENCOUNTER
Received form(s) from pt for Medical Opinion.  Placed form(s) in/on CW's box.  Forms need to be filled out and signed and pt will ..    Call pt to verify form was sent: YES, PT WOULD LIKE TO  [phone]    Copy needs to be sent for scanning after completion: Yes    Haritha Fountain CMA

## 2017-02-13 NOTE — TELEPHONE ENCOUNTER
Patient called back checking the status of these forms since they  Were dropped off last Tuesday 2/7 and now it's Monday 2/13   Please review & fill out form  And call pt once there available for him   To     Thanks

## 2018-01-16 ENCOUNTER — OFFICE VISIT (OUTPATIENT)
Dept: FAMILY MEDICINE | Facility: CLINIC | Age: 43
End: 2018-01-16
Payer: COMMERCIAL

## 2018-01-16 VITALS
HEIGHT: 65 IN | WEIGHT: 169 LBS | SYSTOLIC BLOOD PRESSURE: 136 MMHG | BODY MASS INDEX: 28.16 KG/M2 | OXYGEN SATURATION: 100 % | TEMPERATURE: 98.3 F | RESPIRATION RATE: 16 BRPM | DIASTOLIC BLOOD PRESSURE: 86 MMHG

## 2018-01-16 DIAGNOSIS — G51.0 BELL'S PALSY: ICD-10-CM

## 2018-01-16 DIAGNOSIS — E11.9 TYPE 2 DIABETES MELLITUS WITHOUT COMPLICATION, WITHOUT LONG-TERM CURRENT USE OF INSULIN (H): ICD-10-CM

## 2018-01-16 DIAGNOSIS — Z00.00 ENCOUNTER FOR ROUTINE ADULT HEALTH EXAMINATION WITHOUT ABNORMAL FINDINGS: Primary | ICD-10-CM

## 2018-01-16 DIAGNOSIS — R80.9 MICROALBUMINURIA: ICD-10-CM

## 2018-01-16 DIAGNOSIS — E78.5 HYPERLIPIDEMIA LDL GOAL <100: ICD-10-CM

## 2018-01-16 LAB — HBA1C MFR BLD: 9.9 % (ref 4.3–6)

## 2018-01-16 PROCEDURE — 80061 LIPID PANEL: CPT | Performed by: FAMILY MEDICINE

## 2018-01-16 PROCEDURE — 90471 IMMUNIZATION ADMIN: CPT | Performed by: FAMILY MEDICINE

## 2018-01-16 PROCEDURE — 90686 IIV4 VACC NO PRSV 0.5 ML IM: CPT | Performed by: FAMILY MEDICINE

## 2018-01-16 PROCEDURE — 83036 HEMOGLOBIN GLYCOSYLATED A1C: CPT | Performed by: FAMILY MEDICINE

## 2018-01-16 PROCEDURE — 80048 BASIC METABOLIC PNL TOTAL CA: CPT | Performed by: FAMILY MEDICINE

## 2018-01-16 PROCEDURE — 99396 PREV VISIT EST AGE 40-64: CPT | Mod: 25 | Performed by: FAMILY MEDICINE

## 2018-01-16 PROCEDURE — 36415 COLL VENOUS BLD VENIPUNCTURE: CPT | Performed by: FAMILY MEDICINE

## 2018-01-16 PROCEDURE — 82043 UR ALBUMIN QUANTITATIVE: CPT | Performed by: FAMILY MEDICINE

## 2018-01-16 PROCEDURE — 99213 OFFICE O/P EST LOW 20 MIN: CPT | Mod: 25 | Performed by: FAMILY MEDICINE

## 2018-01-16 PROCEDURE — 83721 ASSAY OF BLOOD LIPOPROTEIN: CPT | Mod: 59 | Performed by: FAMILY MEDICINE

## 2018-01-16 RX ORDER — METFORMIN HCL 500 MG
1000 TABLET, EXTENDED RELEASE 24 HR ORAL 2 TIMES DAILY
Qty: 360 TABLET | Refills: 0 | Status: SHIPPED | OUTPATIENT
Start: 2018-01-16 | End: 2018-10-16

## 2018-01-16 RX ORDER — ATORVASTATIN CALCIUM 10 MG/1
10 TABLET, FILM COATED ORAL DAILY
Qty: 90 TABLET | Refills: 0 | Status: SHIPPED | OUTPATIENT
Start: 2018-01-16 | End: 2018-10-16

## 2018-01-16 RX ORDER — LISINOPRIL 2.5 MG/1
2.5 TABLET ORAL DAILY
Qty: 90 TABLET | Refills: 0 | Status: SHIPPED | OUTPATIENT
Start: 2018-01-16 | End: 2018-10-16

## 2018-01-16 NOTE — MR AVS SNAPSHOT
After Visit Summary   1/16/2018    Lars Daley    MRN: [id number]           Patient Information     Date Of Birth          1975        Visit Information        Provider Department      1/16/2018 8:00 AM Mago Pantoja MD [facility]        Today's Diagnoses     Encounter for routine adult health examination without abnormal findings    -  1    Microalbuminuria        Type 2 diabetes mellitus without complication, without long-term current use of insulin (H)        Hyperlipidemia LDL goal <100        Bell's palsy          Care Instructions      Preventive Health Recommendations  Male Ages 40 to 49    Yearly exam:             See your health care provider every year in order to  o   Review health changes.   o   Discuss preventive care.    o   Review your medicines if your doctor has prescribed any.    You should be tested each year for STDs (sexually transmitted diseases) if you re at risk.     Have a cholesterol test every 5 years.     Have a colonoscopy (test for colon cancer) if someone in your family has had colon cancer or polyps before age 50.     After age 45, have a diabetes test (fasting glucose). If you are at risk for diabetes, you should have this test every 3 years.      Talk with your health care provider about whether or not a prostate cancer screening test (PSA) is right for you.    Shots: Get a flu shot each year. Get a tetanus shot every 10 years.     Nutrition:    Eat at least 5 servings of fruits and vegetables daily.     Eat whole-grain bread, whole-wheat pasta and brown rice instead of white grains and rice.     Talk to your provider about Calcium and Vitamin D.     Lifestyle    Exercise for at least 150 minutes a week (30 minutes a day, 5 days a week). This will help you control your weight and prevent disease.     Limit alcohol to one drink per day.     No smoking.     Wear sunscreen to prevent skin cancer.     See your dentist every six months for an exam  "and cleaning.              Follow-ups after your visit        Who to contact     If you have questions or need follow up information about today's clinic visit or your schedule please contact [facility] directly at [phone].  Normal or non-critical lab and imaging results will be communicated to you by MyChart, letter or phone within 4 business days after the clinic has received the results. If you do not hear from us within 7 days, please contact the clinic through [facility] or phone. If you have a critical or abnormal lab result, we will notify you by phone as soon as possible.  Submit refill requests through [employer] or call your pharmacy and they will forward the refill request to us. Please allow 3 business days for your refill to be completed.          Additional Information About Your Visit        Group [facility] Information     [employer] lets you send messages to your doctor, view your test results, renew your prescriptions, schedule appointments and more. To sign up, go to [email or web address] . Click on \"Log in\" on the left side of the screen, which will take you to the Welcome page. Then click on \"Sign up Now\" on the right side of the page.     You will be asked to enter the access code listed below, as well as some personal information. Please follow the directions to create your username and password.     Your access code is: [email or web address]  Expires: 2018  9:19 AM     Your access code will  in 90 days. If you need help or a new code, please call your [facility] or [phone].        Care EveryWhere ID     This is your Care EveryWhere ID. This could be used by other organizations to access your [facility] medical records  [id number]        Your Vitals Were     Temperature Respirations Height Pulse Oximetry BMI (Body Mass Index)       98.3  F (36.8  C) (Oral) 16 5' 4.5\" (1.638 m) 100% 28.56 kg/m2        Blood Pressure from Last 3 Encounters:   18 136/86   01/10/17 126/86 "   01/06/17 (!) 148/105    Weight from Last 3 Encounters:   01/16/18 169 lb (76.7 kg)   01/10/17 168 lb 14.4 oz (76.6 kg)   01/05/17 174 lb (78.9 kg)              We Performed the Following     Albumin Random Urine Quantitative with Creat Ratio     Basic metabolic panel  (Ca, Cl, CO2, Creat, Gluc, K, Na, BUN)     FOOT EXAM     HC FLU VAC PRESRV FREE QUAD SPLIT VIR 3+YRS IM     Hemoglobin A1c     Lipid panel reflex to direct LDL Fasting          Where to get your medicines      These medications were sent to [facility] [address] [facility] [address] [facility] [address] [address]     Phone:  [phone]     atorvastatin 10 MG tablet    lisinopril 2.5 MG tablet    metFORMIN 500 MG 24 hr tablet          Primary Care Provider Office Phone # Fax #    Mago Pantoja [phone] [address] [address]        Equal Access to Services     EMY BRUMFIELD : Hadii aad ku hadasho Soomaali, waaxda luqadaha, qaybta kaalmada adeegyada, waxay idiin hayhenrietta ruth . So [facility] [phone].    ATENCIÓN: Si habla español, tiene a chambers disposición servicios gratuitos de asistencia lingüística. Llame al [phone].    We comply with applicable federal civil rights laws and [place] laws. We do not discriminate on the basis of race, color, national origin, age, disability, sex, sexual orientation, or gender identity.            Thank you!     Thank you for choosing [facility]  for your care. Our goal is always to provide you with excellent care. Hearing back from our patients is one way we can continue to improve our services. Please take a few minutes to complete the written survey that you may receive in the mail after your visit with us. Thank you!             Your Updated Medication List - Protect others around you: Learn how to safely use, store and throw away your medicines at  www.disposemymeds.org.          This list is accurate as of: 1/16/18  9:19 AM.  Always use your most recent med list.                   Brand Name Dispense Instructions for use Diagnosis    acyclovir 400 MG tablet    ZOVIRAX    35 tablet    Take 1 tablet (400 mg) by mouth 5 times daily for 7 days        aspirin 81 MG tablet     100 tablet    Take 1 tablet (81 mg) by mouth daily    Type 2 diabetes, HbA1C goal < 8% (H)       atorvastatin 10 MG tablet    LIPITOR    90 tablet    Take 1 tablet (10 mg) by mouth daily    Hyperlipidemia LDL goal <100       blood glucose monitoring lancets     100 each    Use to test blood sugars twice daily as directed.    Type 2 diabetes, HbA1C goal < 8% (H)       blood glucose monitoring test strip    HARMEET CONTOUR    100 strip    Use to test blood sugars twice daily as directed.    Type 2 diabetes mellitus without complication, without long-term current use of insulin (H)       lisinopril 2.5 MG tablet    PRINIVIL/Zestril    90 tablet    Take 1 tablet (2.5 mg) by mouth daily    Microalbuminuria, Type 2 diabetes mellitus without complication, without long-term current use of insulin (H)       metFORMIN 500 MG 24 hr tablet    GLUCOPHAGE-XR    360 tablet    Take 2 tablets (1,000 mg) by mouth 2 times daily    Type 2 diabetes mellitus without complication, without long-term current use of insulin (H)       polyvinyl alcohol 1.4 % ophthalmic solution    LIQUIFILM TEARS    15 mL    Place 1 drop into the right eye as needed for dry eyes        predniSONE 20 MG tablet    DELTASONE    15 tablet    Take three tablets (= 60mg) each day for 5 (five) days

## 2018-01-16 NOTE — PROGRESS NOTES
SUBJECTIVE:   CC: Lars Daley is an 42 year old male who presents for preventative health visit.     Healthy Habits:  Annual Exam:  Getting at least 3 servings of Calcium per day:: NO  Bi-annual eye exam:: NO  Dental care twice a year:: NO  Sleep apnea or symptoms of sleep apnea:: None  Taking medications regularly:: Yes  Additional concerns today:: No  PHQ-2 Score: 1    Hasn't been in to clinic for a year, and last time was for f/u [place] Palsy.  R side of face still feels a little 'tingly' and maybe a bit weak in the smile.  Sx's seem worse when he is really stressed.  Minimal, though.  Sometimes massages his face at night.    DM-   Had enough stock-pile to keep taking metformin 1500mg/d (3 tabs)- almost out, ~10 tabs left.  Isn't taking the amaryl.  Isn't taking asa.  Is checking sugars occasionally- 120, 180.  Usually checking in morning or after dinner.  120-150 fasting- one day it was 200- rare.  After dinner checks- similar ranges, 150-180??  A1C back today higher at 9.9, and pt is surprised...  Diet has been better, less sugars, for the past 6-7 months.  Just tortillas/day- just 2-3 daily (prior was having 5-6/day).  Has also been working out ~2x/wk.    HTN- did run out of the lisinopril 2.5mg/d.  Hasn't been taking it.    Lipids- also had enough of a stock-pile for this- still taking one tab daily.  10mg lipitor.    Notes that one uncle  from CAD issues, and mother recently was dx with CAD and has a surgery coming up, possibly stenting in [place]?  He hasn't been taking asa.        Today's PHQ-2 Score: PHQ-2 (  [employer]) 2018   Q1: Little interest or pleasure in doing things 0 0   Q2: Feeling down, depressed or hopeless 1 0   PHQ-2 Score 1 0   Q1: Little interest or pleasure in doing things Not at all -   Q2: Feeling down, depressed or hopeless Several days -   PHQ-2 Score 1 -         Abuse: Current or Past(Physical, Sexual or Emotional)- No  Do you feel safe in your environment -  "Yes    Social History   Substance Use Topics     Smoking status: Never Smoker     Smokeless tobacco: Never Used     Alcohol use 0.0 oz/week     0 Standard drinks or equivalent per week      Comment: socially      If you drink alcohol do you typically have >3 drinks per day or >7 drinks per week? No                      Last PSA: No results found for: PSA    Reviewed orders with patient. Reviewed health maintenance and updated orders accordingly - Yes  Labs reviewed in EPIC    Reviewed and updated as needed this visit by clinical staff         Reviewed and updated as needed this visit by Provider              ROS:  10 point ROS of systems including Constitutional, Eyes, Respiratory, Cardiovascular, Gastroenterology, Genitourinary, Integumentary, Muscularskeletal, Psychiatric were all negative except for pertinent positives noted in my HPI.      OBJECTIVE:   Temp 98.3  F (36.8  C) (Oral)  Resp 16  Ht 5' 4.5\" (1.638 m)  Wt 169 lb (76.7 kg)  SpO2 100%  BMI 28.56 kg/m2  EXAM:  GENERAL: healthy, alert and no distress  EYES: Eyes grossly normal to inspection, PERRL and conjunctivae and sclerae normal  HENT: ear canals and TM's normal, nose and mouth without ulcers or lesions  NECK: no adenopathy, no asymmetry, masses, or scars and thyroid normal to palpation  RESP: lungs clear to auscultation - no rales, rhonchi or wheezes  CV: regular rate and rhythm, normal S1 S2, no S3 or S4, no murmur, click or rub, no peripheral edema and peripheral pulses strong  ABDOMEN: soft, nontender, no hepatosplenomegaly, no masses and bowel sounds normal  MS: no gross musculoskeletal defects noted, no edema  SKIN: no suspicious lesions or rashes  NEURO: Normal strength and tone, mentation intact and speech normal  PSYCH: mentation appears normal, affect normal/bright    ASSESSMENT/PLAN:       ICD-10-CM    1. Encounter for routine adult health examination without abnormal findings Z00.00 HC FLU VAC PRESRV FREE QUAD SPLIT VIR 3+YRS IM   2. " Type 2 diabetes mellitus without complication, without long-term current use of insulin (H) E11.9 FOOT EXAM     Hemoglobin A1c     lisinopril (PRINIVIL/ZESTRIL) 2.5 MG tablet     Albumin Random Urine Quantitative with Creat Ratio     Lipid panel reflex to direct LDL Fasting     Basic metabolic panel  (Ca, Cl, CO2, Creat, Gluc, K, Na, BUN)     metFORMIN (GLUCOPHAGE-XR) 500 MG 24 hr tablet   3. Microalbuminuria R80.9 lisinopril (PRINIVIL/ZESTRIL) 2.5 MG tablet     Albumin Random Urine Quantitative with Creat Ratio   4. Hyperlipidemia LDL goal <100 E78.5 Lipid panel reflex to direct LDL Fasting     atorvastatin (LIPITOR) 10 MG tablet   5. Bell's palsy G51.0      CPE- Discussed diet, calcium and exercise.  Eyes and Teeth or UTD or recommended f/u.  No immunizations needed today.  See orders below for tests ordered and screening needed.      DM/lipids/HTN- Diabetes not in good control now- A1C back at 9.9 today- up from 7.9 at last check in 9/16.  Pt has not been in for f/u for a long time (last visit a year ago, and more for bell's palsy). Despite this, he has not run out of metformin- still taking 3 tabs/day (1500mg/d) nor the lipitor (states he had a big stockpile at home).  He is not taking the amaryl or lisinopril.  Rec taking 2000mg/d of the metformin, and cont good work on diet/exercise.  Will also send in more of the lisinopril at 2.5mg/d to restart.  Cont on the lipitor 10mg/d.  Fasting labs pending.  Urged f/u in 3 months to recheck A1C and see if further medication changes are needed.  Pt is wary of more meds, and has been in the past- would like to try this first.    [place] Palsy- about a year out from initial sx's, still with minor tingling and weakness, more if really stressed.  Discussed some may continue to improve/resolve, but some changes may persist.        COUNSELING:  Reviewed preventive health counseling, as reflected in patient instructions  Special attention given to:        Regular exercise        "Healthy diet/nutrition       Family planning       reports that he has never smoked. He has never used smokeless tobacco.      Estimated body mass index is 28.56 kg/(m^2) as calculated from the following:    Height as of this encounter: 5' 4.5\" (1.638 m).    Weight as of this encounter: 169 lb (76.7 kg).   Weight management plan: Cont good efforts on diet/exercise    Counseling Resources:  ATP IV Guidelines  Pooled Cohorts Equation Calculator  FRAX Risk Assessment  ICSI Preventive Guidelines  Dietary Guidelines for Americans, 2010  USDA's MyPlate  ASA Prophylaxis  Lung CA Screening    Mago Pantoja MD  [facility]  Answers for HPI/ROS submitted by the patient on 1/16/2018     "

## 2018-01-16 NOTE — NURSING NOTE
"Chief Complaint   Patient presents with     Physical     initial /86  Temp 98.3  F (36.8  C) (Oral)  Resp 16  Ht 5' 4.5\" (1.638 m)  Wt 169 lb (76.7 kg)  SpO2 100%  BMI 28.56 kg/m2 Estimated body mass index is 28.56 kg/(m^2) as calculated from the following:    Height as of this encounter: 5' 4.5\" (1.638 m).    Weight as of this encounter: 169 lb (76.7 kg).  BP completed using cuff size: regular.  L  arm      Health Maintenance that is potentially due pending provider review:  Diabetes labs and told him he should have a eye exam    n/a    Amos Gonzalez ma  "

## 2018-01-17 LAB
ANION GAP SERPL CALCULATED.3IONS-SCNC: 6 MMOL/L (ref 3–14)
BUN SERPL-MCNC: 15 MG/DL (ref 7–30)
CALCIUM SERPL-MCNC: 8.4 MG/DL (ref 8.5–10.1)
CHLORIDE SERPL-SCNC: 102 MMOL/L (ref 94–109)
CHOLEST SERPL-MCNC: 190 MG/DL
CO2 SERPL-SCNC: 29 MMOL/L (ref 20–32)
CREAT SERPL-MCNC: 0.75 MG/DL (ref 0.66–1.25)
GFR SERPL CREATININE-BSD FRML MDRD: >90 ML/MIN/1.7M2
GLUCOSE SERPL-MCNC: 183 MG/DL (ref 70–99)
HDLC SERPL-MCNC: 29 MG/DL
LDLC SERPL CALC-MCNC: ABNORMAL MG/DL
LDLC SERPL DIRECT ASSAY-MCNC: 93 MG/DL
NONHDLC SERPL-MCNC: 161 MG/DL
POTASSIUM SERPL-SCNC: 4.2 MMOL/L (ref 3.4–5.3)
SODIUM SERPL-SCNC: 137 MMOL/L (ref 133–144)
TRIGL SERPL-MCNC: 408 MG/DL

## 2018-01-18 LAB
CREAT UR-MCNC: 136 MG/DL
MICROALBUMIN UR-MCNC: 19 MG/L
MICROALBUMIN/CREAT UR: 13.82 MG/G CR (ref 0–17)

## 2018-07-27 ENCOUNTER — TELEPHONE (OUTPATIENT)
Dept: FAMILY MEDICINE | Facility: CLINIC | Age: 43
End: 2018-07-27

## 2018-07-27 NOTE — TELEPHONE ENCOUNTER
Is getting new insurance so he wants to wait for it to start before he makes an appt. He said he will call back. Amos Gonzalez ma

## 2018-07-27 NOTE — TELEPHONE ENCOUNTER
Pt overdue for DM f/u- last apt in 1/18, physical.  Please call him and help him schedule an appt.  Thanks!  CW

## 2018-10-04 ENCOUNTER — TELEPHONE (OUTPATIENT)
Dept: FAMILY MEDICINE | Facility: CLINIC | Age: 43
End: 2018-10-04

## 2018-10-04 NOTE — TELEPHONE ENCOUNTER
Summary:    Patient is due/failing the following:   DM follow up and A1C, albumin    Type of outreach:  Phone, left message for patient to call back.  and Sent letter.  Action needed: Patient needs office visit for DM follow up.                                                                                   BRYANT Ladd, CMA

## 2018-10-04 NOTE — LETTER
[facility]  [address]  [facility] [address]  Phone: [phone]    10/04/18    Lars Daley  [address] [facility] [address]      Dear Lars:    October 4, 2018    Lars Pepperl  [address] [place] [id number]      Dear Lars,    I care about your health and have reviewed your health plan. I have reviewed your medical conditions, medication list, and lab results and am making recommendations based on this review, to better manage your health.    You are in particular need of attention regarding:  -Diabetes  -A1c test    I am recommending that you:  -schedule a FOLLOWUP OFFICE APPOINTMENT with me.  I will recheck your: A1c test.    Please call us at [phone] (or use [employer]) to address the above recommendations.     Thank you for trusting [facility] and we appreciate the opportunity to serve you.  We look forward to supporting your healthcare needs in the future.      Sincerely,      Mago Pantoja MD

## 2018-10-16 ENCOUNTER — OFFICE VISIT (OUTPATIENT)
Dept: FAMILY MEDICINE | Facility: CLINIC | Age: 43
End: 2018-10-16

## 2018-10-16 VITALS
HEIGHT: 65 IN | OXYGEN SATURATION: 99 % | BODY MASS INDEX: 27.74 KG/M2 | HEART RATE: 64 BPM | WEIGHT: 166.5 LBS | TEMPERATURE: 97.4 F | DIASTOLIC BLOOD PRESSURE: 89 MMHG | RESPIRATION RATE: 20 BRPM | SYSTOLIC BLOOD PRESSURE: 138 MMHG

## 2018-10-16 DIAGNOSIS — R80.9 MICROALBUMINURIA: ICD-10-CM

## 2018-10-16 DIAGNOSIS — Z23 NEED FOR INFLUENZA VACCINATION: ICD-10-CM

## 2018-10-16 DIAGNOSIS — E11.9 TYPE 2 DIABETES MELLITUS WITHOUT COMPLICATION, WITHOUT LONG-TERM CURRENT USE OF INSULIN (H): Primary | ICD-10-CM

## 2018-10-16 DIAGNOSIS — E78.5 HYPERLIPIDEMIA LDL GOAL <100: ICD-10-CM

## 2018-10-16 DIAGNOSIS — R03.0 ELEVATED BLOOD-PRESSURE READING WITHOUT DIAGNOSIS OF HYPERTENSION: ICD-10-CM

## 2018-10-16 LAB
ANION GAP SERPL CALCULATED.3IONS-SCNC: 11 MMOL/L (ref 3–14)
BUN SERPL-MCNC: 14 MG/DL (ref 7–30)
CALCIUM SERPL-MCNC: 9.3 MG/DL (ref 8.5–10.1)
CHLORIDE SERPL-SCNC: 102 MMOL/L (ref 94–109)
CHOLEST SERPL-MCNC: 152 MG/DL
CO2 SERPL-SCNC: 23 MMOL/L (ref 20–32)
CREAT SERPL-MCNC: 0.62 MG/DL (ref 0.66–1.25)
CREAT UR-MCNC: 119 MG/DL
GFR SERPL CREATININE-BSD FRML MDRD: >90 ML/MIN/1.7M2
GLUCOSE SERPL-MCNC: 243 MG/DL (ref 70–99)
HBA1C MFR BLD: 10.1 % (ref 0–5.6)
HDLC SERPL-MCNC: 33 MG/DL
LDLC SERPL CALC-MCNC: 64 MG/DL
MICROALBUMIN UR-MCNC: 24 MG/L
MICROALBUMIN/CREAT UR: 19.92 MG/G CR (ref 0–17)
NONHDLC SERPL-MCNC: 119 MG/DL
POTASSIUM SERPL-SCNC: 3.8 MMOL/L (ref 3.4–5.3)
SODIUM SERPL-SCNC: 136 MMOL/L (ref 133–144)
TRIGL SERPL-MCNC: 274 MG/DL

## 2018-10-16 PROCEDURE — 90471 IMMUNIZATION ADMIN: CPT | Performed by: FAMILY MEDICINE

## 2018-10-16 PROCEDURE — 83036 HEMOGLOBIN GLYCOSYLATED A1C: CPT | Performed by: FAMILY MEDICINE

## 2018-10-16 PROCEDURE — 36415 COLL VENOUS BLD VENIPUNCTURE: CPT | Performed by: FAMILY MEDICINE

## 2018-10-16 PROCEDURE — 80048 BASIC METABOLIC PNL TOTAL CA: CPT | Performed by: FAMILY MEDICINE

## 2018-10-16 PROCEDURE — 82043 UR ALBUMIN QUANTITATIVE: CPT | Performed by: FAMILY MEDICINE

## 2018-10-16 PROCEDURE — 80061 LIPID PANEL: CPT | Performed by: FAMILY MEDICINE

## 2018-10-16 PROCEDURE — 90686 IIV4 VACC NO PRSV 0.5 ML IM: CPT | Performed by: FAMILY MEDICINE

## 2018-10-16 PROCEDURE — 99214 OFFICE O/P EST MOD 30 MIN: CPT | Mod: 25 | Performed by: FAMILY MEDICINE

## 2018-10-16 RX ORDER — LISINOPRIL 5 MG/1
5 TABLET ORAL DAILY
Qty: 90 TABLET | Refills: 0 | Status: SHIPPED | OUTPATIENT
Start: 2018-10-16 | End: 2019-02-12

## 2018-10-16 RX ORDER — ATORVASTATIN CALCIUM 10 MG/1
10 TABLET, FILM COATED ORAL DAILY
Qty: 90 TABLET | Refills: 0 | Status: SHIPPED | OUTPATIENT
Start: 2018-10-16 | End: 2019-11-15

## 2018-10-16 RX ORDER — METFORMIN HCL 500 MG
1000 TABLET, EXTENDED RELEASE 24 HR ORAL 2 TIMES DAILY
Qty: 360 TABLET | Refills: 0 | Status: SHIPPED | OUTPATIENT
Start: 2018-10-16 | End: 2019-02-12

## 2018-10-16 NOTE — PROGRESS NOTES
SUBJECTIVE:   Lars Daley is a 43 year old male who presents to clinic today for the following health issues:    Diabetes Follow-up    Patient is checking blood sugars: not at all    Diabetic concerns: None and other - out of meds for 2+ months     Symptoms of hypoglycemia (low blood sugar): none     Paresthesias (numbness or burning in feet) or sores: No     Date of last diabetic eye exam: DUE    BP Readings from Last 2 Encounters:   10/16/18 138/89   01/16/18 136/86     Hemoglobin A1C (%)   Date Value   10/16/2018 10.1 (H)   01/16/2018 9.9 (H)     LDL Cholesterol Calculated (mg/dL)   Date Value   10/16/2018 64   01/16/2018     Cannot estimate LDL when triglyceride exceeds 400 mg/dL     LDL Cholesterol Direct (mg/dL)   Date Value   01/16/2018 93     Diabetes Management Resources    Amount of exercise or physical activity: None    Problems taking medications regularly: Yes,  No meds    Medication side effects: none    Diet: regular (no restrictions)    Ran out of meds in 6/18- all of them.  Since then, has been eating better, trying, watching sugar intake, less bread.  More greens and vegetables.  Hard to eat that way- not fun.    At 1/18 visit, he had run out of most meds except for metformin 1500mg/d.  A1C was 9.9 at that time- didn't want to start amaryl again, so sent in rx for metformin 200mg/d and lisinopril 2.5mg/d and lipitor 10mg/d.  RTC in 3 months, but didn't.    Other sx's-   Can't stop moving, reved up, slightly anxious for ~2 months.  Has been anxious before, feels similar.  Doesn't want to do meds for it.  Sleeping okay.  Eating well as above.      Problem list and histories reviewed & adjusted, as indicated.  Additional history: as documented      Patient Active Problem List   Diagnosis     Hyperlipidemia LDL goal <100     Type 2 diabetes mellitus without complication, without long-term current use of insulin (H)     Plantar warts     Tension headache     Microalbuminuria     Bell's palsy     Past  Surgical History:   Procedure Laterality Date     STRESS ECHO (METRO)  11    normal       Social History   Substance Use Topics     Smoking status: Never Smoker     Smokeless tobacco: Never Used     Alcohol use 0.0 oz/week     0 Standard drinks or equivalent per week      Comment: socially     Family History   Problem Relation Age of Onset     HEART DISEASE Maternal Uncle 43     By pass surgery     Diabetes Mother 40     Coronary Artery Disease Mother 59     In [place], blockages in heart, upcoming surgery, ? stent?     C.A.D. Maternal Uncle 49      of MI, no DM, no cigs         Current Outpatient Prescriptions   Medication Sig Dispense Refill     aspirin 81 MG tablet Take 1 tablet (81 mg) by mouth daily 90 tablet 3     atorvastatin (LIPITOR) 10 MG tablet Take 1 tablet (10 mg) by mouth daily 90 tablet 0     lisinopril (PRINIVIL/ZESTRIL) 5 MG tablet Take 1 tablet (5 mg) by mouth daily 90 tablet 0     metFORMIN (GLUCOPHAGE-XR) 500 MG 24 hr tablet Take 2 tablets (1,000 mg) by mouth 2 times daily 360 tablet 0     acyclovir (ZOVIRAX) 400 MG tablet Take 1 tablet (400 mg) by mouth 5 times daily for 7 days 35 tablet 0     blood glucose monitoring (HARMEET CONTOUR) test strip Use to test blood sugars twice daily as directed. (Patient not taking: Reported on 10/16/2018) 100 strip PRN     Lancets (MICROLET) MISC Use to test blood sugars twice daily as directed. (Patient not taking: Reported on 10/16/2018) 100 each PRN     polyvinyl alcohol (LIQUIFILM TEARS) 1.4 % ophthalmic solution Place 1 drop into the right eye as needed for dry eyes (Patient not taking: Reported on 10/16/2018) 15 mL 0     predniSONE (DELTASONE) 20 MG tablet Take three tablets (= 60mg) each day for 5 (five) days (Patient not taking: Reported on 10/16/2018) 15 tablet 0     Allergies   Allergen Reactions     Nkda [No Known Drug Allergies]      Recent Labs   Lab Test  10/16/18   0823  18   0859   16   1541   09/23/15   1017  03/11/15   1202   "01/09/15   1621   A1C  10.1*  9.9*   --   7.9*   < >  9.0*   --   9.4*   LDL  64  Cannot estimate LDL when triglyceride exceeds 400 mg/dL  93   --    --    --   108   --    --    HDL  33*  29*   --    --    --   34*   --    --    TRIG  274*  408*   --    --    --   180*   --    --    ALT   --    --    --    --    --   48  56  82*   CR  0.62*  0.75   < >   --    --   0.68  0.69  0.76   GFRESTIMATED  >90  >90   < >   --    --   >90  Non  GFR Calc    >90  Non  GFR Calc    >90  Non  GFR Calc     GFRESTBLACK  >90  >90   < >   --    --   >90   GFR Calc    >90   GFR Calc    >90   GFR Calc     POTASSIUM  3.8  4.2   < >   --    --   4.0  3.9  4.6   TSH   --    --    --   1.13   --   1.33   --    --     < > = values in this interval not displayed.      BP Readings from Last 3 Encounters:   10/16/18 138/89   01/16/18 136/86   01/10/17 126/86    Wt Readings from Last 3 Encounters:   10/16/18 166 lb 8 oz (75.5 kg)   01/16/18 169 lb (76.7 kg)   01/10/17 168 lb 14.4 oz (76.6 kg)           Labs reviewed in EPIC    Reviewed and updated as needed this visit by clinical staff  Tobacco  Allergies  Meds  Problems  Med Hx  Surg Hx  Fam Hx  Soc Hx        Reviewed and updated as needed this visit by Provider  Allergies  Meds  Problems         ROS:  Constitutional, HEENT, cardiovascular, pulmonary, gi and gu systems are negative, except as otherwise noted.    OBJECTIVE:     /89  Pulse 64  Temp 97.4  F (36.3  C) (Tympanic)  Resp 20  Ht 5' 4.5\" (1.638 m)  Wt 166 lb 8 oz (75.5 kg)  SpO2 99%  BMI 28.14 kg/m2  Body mass index is 28.14 kg/(m^2).  GENERAL APPEARANCE: healthy, alert and no distress     EYES: PERRL, sclera clear     HENT: nose and mouth without ulcers or lesions     NECK: no adenopathy, no asymmetry, masses, or scars and thyroid normal to palpation     RESP: lungs clear to auscultation - no rales, rhonchi or " wheezes     CV: regular rates and rhythm, normal S1 S2, no S3 or S4 and no murmur, click or rub      Abdomen: soft, nontender, no HSM or masses and bowel sounds normal     Ext: warm, dry, no edema      Psych: full range affect, normal speech and grooming, judgement and insight intact     Diagnostic Test Results:  Results for orders placed or performed in visit on 10/16/18   Lipid panel reflex to direct LDL Fasting   Result Value Ref Range    Cholesterol 152 <200 mg/dL    Triglycerides 274 (H) <150 mg/dL    HDL Cholesterol 33 (L) >39 mg/dL    LDL Cholesterol Calculated 64 <100 mg/dL    Non HDL Cholesterol 119 <130 mg/dL   Hemoglobin A1c   Result Value Ref Range    Hemoglobin A1C 10.1 (H) 0 - 5.6 %   Basic metabolic panel  (Ca, Cl, CO2, Creat, Gluc, K, Na, BUN)   Result Value Ref Range    Sodium 136 133 - 144 mmol/L    Potassium 3.8 3.4 - 5.3 mmol/L    Chloride 102 94 - 109 mmol/L    Carbon Dioxide 23 20 - 32 mmol/L    Anion Gap 11 3 - 14 mmol/L    Glucose 243 (H) 70 - 99 mg/dL    Urea Nitrogen 14 7 - 30 mg/dL    Creatinine 0.62 (L) 0.66 - 1.25 mg/dL    GFR Estimate >90 >60 mL/min/1.7m2    GFR Estimate If Black >90 >60 mL/min/1.7m2    Calcium 9.3 8.5 - 10.1 mg/dL   Albumin Random Urine Quantitative with Creat Ratio   Result Value Ref Range    Creatinine Urine 119 mg/dL    Albumin Urine mg/L 24 mg/L    Albumin Urine mg/g Cr 19.92 (H) 0 - 17 mg/g Cr       ASSESSMENT/PLAN:         ICD-10-CM    1. Type 2 diabetes mellitus without complication, without long-term current use of insulin (H) E11.9 Hemoglobin A1c     Basic metabolic panel  (Ca, Cl, CO2, Creat, Gluc, K, Na, BUN)     Albumin Random Urine Quantitative with Creat Ratio     lisinopril (PRINIVIL/ZESTRIL) 5 MG tablet     metFORMIN (GLUCOPHAGE-XR) 500 MG 24 hr tablet     aspirin 81 MG tablet   2. Hyperlipidemia LDL goal <100 E78.5 Lipid panel reflex to direct LDL Fasting     atorvastatin (LIPITOR) 10 MG tablet   3. Elevated blood-pressure reading without diagnosis of  hypertension R03.0 Basic metabolic panel  (Ca, Cl, CO2, Creat, Gluc, K, Na, BUN)     Albumin Random Urine Quantitative with Creat Ratio   4. Microalbuminuria R80.9 lisinopril (PRINIVIL/ZESTRIL) 5 MG tablet   5. Need for influenza vaccination Z23 FLU VAC PRESRV FREE QUAD SPLIT VIR, IM (3+ YRS)     ADMIN 1st VACCINE     DM- uncontrolled DM with A1C due to being out of his metformin medication for the past couple months.  Since running out, though, he's been working harder on having less sugars/carbs in his diet.  Will have him restart the metformin 2000mg/d, and continue his work on his diet as much as possible (difficult for him).  Rec f/u in 2-3 months with A1C to assess improvement so we can further adjust his medications if needed.    Lipids- pt ran out of lipitor 10mg/d as well, though LDL looks good returning today after appt at 64.  Lipitor still indicated, however, and was sent at his appointment.  May recheck at his 12/18 appt to make sure his LDL does not go too low (<45).    Mildly elevated BP and mild microalbumin elevation- BP's have been fine in the past, but borderline recently.  He ran out of his lisinopril as well in the last few months- was on 2.5mg/d with no se's.  Will restart at 5mg/d due to borderline BP today.  Risks and benefits of medication(s) including potential side effects reviewed with patient.  Questions answered.  Recheck BMP at 12/18 f/u visit.    Flu vaccine- Risks/benefits discussed, given today.       Discussed f/u in 2 months w/u recheck of lipids, BMP and A1C to see if he's going in the right direction.  Appt made.      Mago Pantoja MD  [facility]

## 2018-10-16 NOTE — MR AVS SNAPSHOT
After Visit Summary   10/16/2018    Lars Daley    MRN: [id number]           Patient Information     Date Of Birth          1975        Visit Information        Provider Department      10/16/2018 7:30 AM Mago Pantoja MD [facility]        Today's Diagnoses     Type 2 diabetes mellitus without complication, without long-term current use of insulin (H)    -  1    Hyperlipidemia LDL goal <100        Elevated blood-pressure reading without diagnosis of hypertension        Microalbuminuria           Follow-ups after your visit        Follow-up notes from your care team     Return in about 2 months (around 12/16/2018) for Diabetes, BP Recheck, come fasting .      Your next 10 appointments already scheduled     Dec 11, 2018  7:30 AM CST   Office Visit with Mago Pantoja MD   [facility] ([facility]    [address] [facility] [address] [phone]           Bring a current list of meds and any records pertaining to this visit. For Physicals, please bring immunization records and any forms needing to be filled out. Please arrive 10 minutes early to complete paperwork.              Who to contact     If you have questions or need follow up information about today's clinic visit or your schedule please contact [facility] directly at [phone].  Normal or non-critical lab and imaging results will be communicated to you by MyChart, letter or phone within 4 business days after the clinic has received the results. If you do not hear from us within 7 days, please contact the clinic through MyChart or phone. If you have a critical or abnormal lab result, we will notify you by phone as soon as possible.  Submit refill requests through [employer] or call your pharmacy and they will forward the refill request to us. Please allow 3 business days for your refill to be completed.          Additional Information About Your Visit       "  MyChart Information     [employer] lets you send messages to your doctor, view your test results, renew your prescriptions, schedule appointments and more. To sign up, go to [email or web address] . Click on \"Log in\" on the left side of the screen, which will take you to the Welcome page. Then click on \"Sign up Now\" on the right side of the page.     You will be asked to enter the access code listed below, as well as some personal information. Please follow the directions to create your username and password.     Your access code is: [email or web address]  Expires: 2019  9:05 AM     Your access code will  in 90 days. If you need help or a new code, please call your [place] clinic or [phone].        Care EveryWhere ID     This is your Care EveryWhere ID. This could be used by other organizations to access your [place] medical records  [id number]        Your Vitals Were     Pulse Temperature Respirations Height Pulse Oximetry BMI (Body Mass Index)    64 97.4  F (36.3  C) (Tympanic) 20 5' 4.5\" (1.638 m) 99% 28.14 kg/m2       Blood Pressure from Last 3 Encounters:   10/16/18 138/89   18 136/86   01/10/17 126/86    Weight from Last 3 Encounters:   10/16/18 166 lb 8 oz (75.5 kg)   18 169 lb (76.7 kg)   01/10/17 168 lb 14.4 oz (76.6 kg)              We Performed the Following     Albumin Random Urine Quantitative with Creat Ratio     Basic metabolic panel  (Ca, Cl, CO2, Creat, Gluc, K, Na, BUN)     Hemoglobin A1c     Lipid panel reflex to direct LDL Fasting          Today's Medication Changes          These changes are accurate as of 10/16/18  9:05 AM.  If you have any questions, ask your nurse or doctor.               These medicines have changed or have updated prescriptions.        Dose/Directions    lisinopril 5 MG tablet   Commonly known as:  PRINIVIL/ZESTRIL   This may have changed:    - medication strength  - how much to take   Used for:  Microalbuminuria, Type 2 diabetes mellitus without " complication, without long-term current use of insulin (H)   Changed by:  Mago Pantoja MD        Dose:  5 mg   Take 1 tablet (5 mg) by mouth daily   Quantity:  90 tablet   Refills:  0            Where to get your medicines      These medications were sent to [facility] - [place], [place] - [address] [facility] [address] [address]     Phone:  [phone]     aspirin 81 MG tablet    atorvastatin 10 MG tablet    lisinopril 5 MG tablet    metFORMIN 500 MG 24 hr tablet                Primary Care Provider Office Phone # Fax #    Mago Pantoja [phone] [address] [address]        Equal Access to Services     EMY BRUMFIELD : Hadii bony ku hadasho Soomaali, waaxda luqadaha, qaybta kaalmada adeegyada, waxay idiin hayhenrietta anderson. So [facility] [phone].    ATENCIÓN: Si habla español, tiene a chambers disposición servicios gratuitos de asistencia lingüística. Ll[facility] [phone].    We comply with applicable federal civil rights laws and [place] laws. We do not discriminate on the basis of race, color, national origin, age, disability, sex, sexual orientation, or gender identity.            Thank you!     Thank you for choosing [facility]  for your care. Our goal is always to provide you with excellent care. Hearing back from our patients is one way we can continue to improve our services. Please take a few minutes to complete the written survey that you may receive in the mail after your visit with us. Thank you!             Your Updated Medication List - Protect others around you: Learn how to safely use, store and throw away your medicines at www.disposemymeds.org.          This list is accurate as of 10/16/18  9:05 AM.  Always use your most recent med list.                   Brand Name Dispense Instructions for use Diagnosis    acyclovir 400 MG tablet    ZOVIRAX    35  tablet    Take 1 tablet (400 mg) by mouth 5 times daily for 7 days        aspirin 81 MG tablet     90 tablet    Take 1 tablet (81 mg) by mouth daily    Type 2 diabetes mellitus without complication, without long-term current use of insulin (H)       atorvastatin 10 MG tablet    LIPITOR    90 tablet    Take 1 tablet (10 mg) by mouth daily    Hyperlipidemia LDL goal <100       blood glucose monitoring lancets     100 each    Use to test blood sugars twice daily as directed.    Type 2 diabetes, HbA1C goal < 8% (H)       blood glucose monitoring test strip    HARMEET CONTOUR    100 strip    Use to test blood sugars twice daily as directed.    Type 2 diabetes mellitus without complication, without long-term current use of insulin (H)       lisinopril 5 MG tablet    PRINIVIL/ZESTRIL    90 tablet    Take 1 tablet (5 mg) by mouth daily    Microalbuminuria, Type 2 diabetes mellitus without complication, without long-term current use of insulin (H)       metFORMIN 500 MG 24 hr tablet    GLUCOPHAGE-XR    360 tablet    Take 2 tablets (1,000 mg) by mouth 2 times daily    Type 2 diabetes mellitus without complication, without long-term current use of insulin (H)       polyvinyl alcohol 1.4 % ophthalmic solution    LIQUIFILM TEARS    15 mL    Place 1 drop into the right eye as needed for dry eyes        predniSONE 20 MG tablet    DELTASONE    15 tablet    Take three tablets (= 60mg) each day for 5 (five) days

## 2018-10-16 NOTE — LETTER
October 23, 2018      Lars Daley  [address]  [facility] [address]        Dear ,    We are writing to inform you of your test results.    -Your microalbumin level (which is the urine test that can signal signs of early chronic kidney disease if elevated to >30) is slightly elevated, but getting back on the lisinopril medication should help this and your kidney function in general.   -Your cholesterol panel looks good with a low LDL (the bad cholesterol), though we would like to see your HDL (the good cholesterol) higher (usually goes up with increased aerobic activity).   -Your basic metabolic panel (which includes electrolyte levels, blood sugar level and kidney function tests) looks good other than the high glucose.   --Your hemoglobin A1C (the three month average of your glucose levels) is high as we discussed, but hopefully can improve significantly with getting you back on your diabetic medication (and hopefully continuing your improved diet!).    Resulted Orders   Lipid panel reflex to direct LDL Fasting   Result Value Ref Range    Cholesterol 152 <200 mg/dL    Triglycerides 274 (H) <150 mg/dL      Comment:      Borderline high:  150-199 mg/dl  High:             200-499 mg/dl  Very high:       >499 mg/dl      HDL Cholesterol 33 (L) >39 mg/dL    LDL Cholesterol Calculated 64 <100 mg/dL      Comment:      Desirable:       <100 mg/dl    Non HDL Cholesterol 119 <130 mg/dL   Hemoglobin A1c   Result Value Ref Range    Hemoglobin A1C 10.1 (H) 0 - 5.6 %      Comment:      Normal <5.7% Prediabetes 5.7-6.4%  Diabetes 6.5% or higher - adopted from ADA   consensus guidelines.     Basic metabolic panel  (Ca, Cl, CO2, Creat, Gluc, K, Na, BUN)   Result Value Ref Range    Sodium 136 133 - 144 mmol/L    Potassium 3.8 3.4 - 5.3 mmol/L    Chloride 102 94 - 109 mmol/L    Carbon Dioxide 23 20 - 32 mmol/L    Anion Gap 11 3 - 14 mmol/L    Glucose 243 (H) 70 - 99 mg/dL    Urea Nitrogen 14 7 - 30 mg/dL    Creatinine  0.62 (L) 0.66 - 1.25 mg/dL    GFR Estimate >90 >60 mL/min/1.7m2      Comment:      Non  GFR Calc    GFR Estimate If Black >90 >60 mL/min/1.7m2      Comment:       GFR Calc    Calcium 9.3 8.5 - 10.1 mg/dL   Albumin Random Urine Quantitative with Creat Ratio   Result Value Ref Range    Creatinine Urine 119 mg/dL    Albumin Urine mg/L 24 mg/L    Albumin Urine mg/g Cr 19.92 (H) 0 - 17 mg/g Cr       If you have any questions or concerns, please call the clinic at the number listed above.       Sincerely,        Mago Pantoja MD

## 2018-10-16 NOTE — NURSING NOTE
"Chief Complaint   Patient presents with     Diabetes     /89  Pulse 64  Temp 97.4  F (36.3  C) (Tympanic)  Resp 20  Ht 5' 4.5\" (1.638 m)  Wt 166 lb 8 oz (75.5 kg)  SpO2 99%  BMI 28.14 kg/m2 Estimated body mass index is 28.14 kg/(m^2) as calculated from the following:    Height as of this encounter: 5' 4.5\" (1.638 m).    Weight as of this encounter: 166 lb 8 oz (75.5 kg).        Health Maintenance due pending provider review:  NONE    n/a    Haritha Fountain CMA  "

## 2018-10-22 NOTE — PROGRESS NOTES
Please send letter below with copy of results.  Thanks! CW    Here are your lab results from your recent visit...  -Your microalbumin level (which is the urine test that can signal signs of early chronic kidney disease if elevated to >30) is slightly elevated, but getting back on the lisinopril medication should help this and your kidney function in general.  -Your cholesterol panel looks good with a low LDL (the bad cholesterol), though we would like to see your HDL (the good cholesterol) higher (usually goes up with increased aerobic activity).  -Your basic metabolic panel (which includes electrolyte levels, blood sugar level and kidney function tests) looks good other than the high glucose.  --Your hemoglobin A1C (the three month average of your glucose levels) is high as we discussed, but hopefully can improve significantly with getting you back on your diabetic medication (and hopefully continuing your improved diet!).    Please let me know if you have any questions.  Best,   Erick Pantoja MD

## 2019-01-14 ENCOUNTER — OFFICE VISIT (OUTPATIENT)
Dept: FAMILY MEDICINE | Facility: CLINIC | Age: 44
End: 2019-01-14

## 2019-01-14 VITALS
TEMPERATURE: 98.4 F | SYSTOLIC BLOOD PRESSURE: 123 MMHG | RESPIRATION RATE: 14 BRPM | DIASTOLIC BLOOD PRESSURE: 81 MMHG | HEART RATE: 79 BPM | BODY MASS INDEX: 27.32 KG/M2 | WEIGHT: 164 LBS | OXYGEN SATURATION: 97 % | HEIGHT: 65 IN

## 2019-01-14 DIAGNOSIS — R10.33 PERIUMBILICAL PAIN: Primary | ICD-10-CM

## 2019-01-14 DIAGNOSIS — K59.09 OTHER CONSTIPATION: ICD-10-CM

## 2019-01-14 DIAGNOSIS — E11.65 TYPE 2 DIABETES MELLITUS WITH HYPERGLYCEMIA, WITHOUT LONG-TERM CURRENT USE OF INSULIN (H): ICD-10-CM

## 2019-01-14 PROCEDURE — 99214 OFFICE O/P EST MOD 30 MIN: CPT | Performed by: FAMILY MEDICINE

## 2019-01-14 RX ORDER — DOCUSATE SODIUM 250 MG
250 CAPSULE ORAL DAILY PRN
Qty: 30 CAPSULE | Refills: 0 | Status: SHIPPED | OUTPATIENT
Start: 2019-01-14 | End: 2019-01-24

## 2019-01-14 ASSESSMENT — MIFFLIN-ST. JEOR: SCORE: 1552.84

## 2019-01-14 NOTE — PATIENT INSTRUCTIONS
periumbilical pain associated with bloating & constipation.    DDx-incldue mild gastroesophageal reflux disease, IRRITABLE BOWEL SYNDROME? Gaseous discomfort  Left CVA tenderness is negative   No urinary symptoms  Appetite & weight stable       Advised to add fresh green veggies, add more water to diet.  Prescribed Colace bedtime as needed  # 30 given.  FOLLOW UP in a month      Wife was tested positive for salmonella - patient is reassured no signs or symptoms to suggest that.   Stool test can be done - if diarrhea associated with abdomen pain  He will stop at lab- to get supplies and test is future        2. Type 2 diabetes mellitus with hyperglycemia, without long-term current use of insulin (H)  Plan: he reports watching diet but not checking SMBG    Encouraged to make follow up appointment

## 2019-01-14 NOTE — PROGRESS NOTES
SUBJECTIVE:   Lars Daley is a 43 year old male who presents to clinic today for the following health issues:      Abdominal Pain      Duration: mid Dec    Description (location/character/radiation): periumbical pain radiating around left side, painful to touch, no itch, no rash       Associated flank pain: No  Constipation- hard stools daily, and has to push often. No diarrhea. No nausea and vomiting   No urgency,frequency,buring,leakage of urine.  No fever       Intensity:  mild    Accompanying signs and symptoms:        Fever/Chills: no        Gas/Bloating: YES- gas,        Nausea/vomitting: no        Diarrhea: YES- alternating with constipation       Dysuria or Hematuria: no     History (previous similar pain/trauma/previous testing):     Precipitating or alleviating factors:       Pain worse with eating/BM/urination: yes about 10 minutes after eating       Pain relieved by BM: YES- a little bit    Therapies tried and outcome: laxative when constipated but no relief of abdominal pain    LMP:  not applicable      PROBLEMS TO ADD ON...wife had Salmonella, had a lot of diarrhea and abdomen pain  2 daughters having same symptoms    Problem list and histories reviewed & adjusted, as indicated.  Additional history: as documented    Patient Active Problem List   Diagnosis     Hyperlipidemia LDL goal <100     Type 2 diabetes mellitus without complication, without long-term current use of insulin (H)     Plantar warts     Tension headache     Microalbuminuria     Bell's palsy     Past Surgical History:   Procedure Laterality Date     STRESS ECHO ([facility]RO)  1/30/11    normal       Social History     Tobacco Use     Smoking status: Never Smoker     Smokeless tobacco: Never Used   Substance Use Topics     Alcohol use: Yes     Alcohol/week: 0.0 oz     Comment: socially     Family History   Problem Relation Age of Onset     Heart Disease Maternal Uncle 43        By pass surgery     Diabetes Mother 40     Coronary Artery  "Disease Mother 59        In [place], blockages in heart, upcoming surgery, ? stent?     C.A.D. Maternal Uncle 49         of MI, no DM, no cigs           Reviewed and updated as needed this visit by clinical staff  Allergies  Meds       Reviewed and updated as needed this visit by Provider         ROS:  Constitutional, HEENT, cardiovascular, pulmonary, gi and gu systems are negative, except as otherwise noted.    OBJECTIVE:     /81   Pulse 79   Temp 98.4  F (36.9  C) (Oral)   Resp 14   Ht 1.638 m (5' 4.5\")   Wt 74.4 kg (164 lb)   SpO2 97%   BMI 27.72 kg/m    Body mass index is 27.72 kg/m .  GENERAL: healthy, alert and no distress  NECK: no adenopathy, no asymmetry, masses, or scars and thyroid normal to palpation  RESP: lungs clear to auscultation - no rales, rhonchi or wheezes  CV: regular rate and rhythm, normal S1 S2, no S3 or S4, no murmur, click or rub, no peripheral edema and peripheral pulses strong  ABDOMEN: soft, nontender, no hepatosplenomegaly, no masses and bowel sounds normal  MS: no gross musculoskeletal defects noted, no edema    Diagnostic Test Results:  No results found for this or any previous visit (from the past 24 hour(s)).    ASSESSMENT/PLAN:   1. Periumbilical pain  Plan: 44 yo male with periumbilical pain associated with bloating & constipation.    DDx-incldue mild gastroesophageal reflux disease, IRRITABLE BOWEL SYNDROME? Gaseous discomfort  Left CVA tenderness is negative   No urinary symptoms  Appetite & weight stable       Advised to add fresh green veggies, add more water to diet.  Prescribed Colace bedtime as needed  # 30 given.  FOLLOW UP in a month      Wife was tested positive for salmonella - patient is reassured no signs or symptoms to suggest that.   Stool test can be done - if diarrhea associated with abdomen pain  He will stop at lab- to get supplies and test is future        2. Type 2 diabetes mellitus with hyperglycemia, without long-term current use of " insulin (H)  Plan: he reports watching diet but not checking SMBG    Encouraged to make follow up appointment     Marissa Sexton MD  [facility]

## 2019-01-14 NOTE — NURSING NOTE
"Chief Complaint   Patient presents with     Abdominal Pain       Initial /81   Pulse 79   Temp 98.4  F (36.9  C) (Oral)   Resp 14   Ht 1.638 m (5' 4.5\")   Wt 74.4 kg (164 lb)   SpO2 97%   BMI 27.72 kg/m   Estimated body mass index is 27.72 kg/m  as calculated from the following:    Height as of this encounter: 1.638 m (5' 4.5\").    Weight as of this encounter: 74.4 kg (164 lb).  BP completed using cuff size: large    Health Maintenance that is potentially due pending provider review:  Health Maintenance Due   Topic Date Due     HIV SCREEN (SYSTEM ASSIGNED)  02/04/1992     EYE EXAM Q1 YEAR  10/07/2014     TSH W/ FREE T4 REFLEX Q2 YEAR  09/16/2018     FOOT EXAM Q1 YEAR  01/16/2019     PHQ-2 Q1 YR  01/16/2019     Advised to schedule eye exam      "

## 2019-02-12 ENCOUNTER — OFFICE VISIT (OUTPATIENT)
Dept: FAMILY MEDICINE | Facility: CLINIC | Age: 44
End: 2019-02-12

## 2019-02-12 VITALS
HEART RATE: 78 BPM | TEMPERATURE: 98.4 F | SYSTOLIC BLOOD PRESSURE: 123 MMHG | OXYGEN SATURATION: 99 % | HEIGHT: 65 IN | RESPIRATION RATE: 15 BRPM | WEIGHT: 162.2 LBS | BODY MASS INDEX: 27.02 KG/M2 | DIASTOLIC BLOOD PRESSURE: 75 MMHG

## 2019-02-12 DIAGNOSIS — E11.9 TYPE 2 DIABETES MELLITUS WITHOUT COMPLICATION, WITHOUT LONG-TERM CURRENT USE OF INSULIN (H): Primary | ICD-10-CM

## 2019-02-12 DIAGNOSIS — R10.33 ABDOMINAL WALL PAIN IN PERIUMBILICAL REGION: ICD-10-CM

## 2019-02-12 DIAGNOSIS — R80.9 MICROALBUMINURIA: ICD-10-CM

## 2019-02-12 LAB — HBA1C MFR BLD: 9.9 % (ref 0–5.6)

## 2019-02-12 PROCEDURE — 36415 COLL VENOUS BLD VENIPUNCTURE: CPT | Performed by: FAMILY MEDICINE

## 2019-02-12 PROCEDURE — 83036 HEMOGLOBIN GLYCOSYLATED A1C: CPT | Performed by: FAMILY MEDICINE

## 2019-02-12 PROCEDURE — 99214 OFFICE O/P EST MOD 30 MIN: CPT | Performed by: FAMILY MEDICINE

## 2019-02-12 RX ORDER — METFORMIN HCL 500 MG
1000 TABLET, EXTENDED RELEASE 24 HR ORAL 2 TIMES DAILY
Qty: 360 TABLET | Refills: 0 | Status: SHIPPED | OUTPATIENT
Start: 2019-02-12 | End: 2019-11-15

## 2019-02-12 RX ORDER — LISINOPRIL 5 MG/1
5 TABLET ORAL DAILY
Qty: 90 TABLET | Refills: 0 | Status: SHIPPED | OUTPATIENT
Start: 2019-02-12 | End: 2019-11-15

## 2019-02-12 ASSESSMENT — MIFFLIN-ST. JEOR: SCORE: 1539.67

## 2019-02-12 NOTE — PATIENT INSTRUCTIONS
Make appointment with Janelle to discuss other diabetic medication option asap.    CT scan ordered at [facility] to look into the abdominal wall pain.  They should call you to schedule, but call us if you have not heard from them.

## 2019-02-12 NOTE — PROGRESS NOTES
SUBJECTIVE:   Lars Daley is a 44 year old male who presents to clinic today for the following health issues:      Diabetes Follow-up - A1C high at 10.1 at last check in 10/18  - taking 1500mg of metformin daily.  Also taking 'natural tea' that is supposed to help with DM.  He's also just 'eating less', and weight is down a bit.    Patient is checking blood sugars: not at all    Diabetic concerns: None     Symptoms of hypoglycemia (low blood sugar): none     Paresthesias (numbness or burning in feet) or sores: No     Date of last diabetic eye exam: will schedule     Diabetes Management Resources    Hypertension Follow-up - BP actually okay, but positive microalbumin.  On lisinopril 5mg/d for the microalbumin and DM.    Outpatient blood pressures are not being checked.    Low Salt Diet: not monitoring salt      ABD PAIN-   Patient has still been experiencing constant abdominal pain for the last 2-3 months that goes from the left side and radiates into his back. (He saw Dr. Sexton last month for this).  There is a specific area on his abd, ~5cm left of umbilicus that is very tender to palpation, and then he has odd sensation to touch on left lateral side, sometimes burning.  Mild/moderate pain.  Never had any rash.  Constipation was an issue at first, but that has resolved after taking constipation txt over a month ago.  Getting 'cleared out' didn't help sx's at all.        BP Readings from Last 2 Encounters:   02/12/19 123/75   01/14/19 123/81     Hemoglobin A1C (%)   Date Value   02/12/2019 9.9 (H)   10/16/2018 10.1 (H)     LDL Cholesterol Calculated (mg/dL)   Date Value   10/16/2018 64   01/16/2018     Cannot estimate LDL when triglyceride exceeds 400 mg/dL     LDL Cholesterol Direct (mg/dL)   Date Value   01/16/2018 93       Amount of exercise or physical activity: 1 day/week for an average of 30-45 minutes    Problems taking medications regularly: No    Medication side effects: none    Diet: regular (no  restrictions)        Problem list and histories reviewed & adjusted, as indicated.  Additional history: as documented      Patient Active Problem List   Diagnosis     Hyperlipidemia LDL goal <100     Type 2 diabetes mellitus without complication, without long-term current use of insulin (H)     Plantar warts     Tension headache     Microalbuminuria     Bell's palsy     Past Surgical History:   Procedure Laterality Date     STRESS ECHO ([facility]RO)  11    normal       Social History     Tobacco Use     Smoking status: Never Smoker     Smokeless tobacco: Never Used   Substance Use Topics     Alcohol use: Yes     Alcohol/week: 0.0 oz     Comment: socially     Family History   Problem Relation Age of Onset     Heart Disease Maternal Uncle 43        By pass surgery     Diabetes Mother 40     Coronary Artery Disease Mother 59        In [place], blockages in heart, upcoming surgery, ? stent?     C.A.D. Maternal Uncle 49         of MI, no DM, no cigs         Current Outpatient Medications   Medication Sig Dispense Refill     aspirin 81 MG tablet Take 1 tablet (81 mg) by mouth daily 90 tablet 3     atorvastatin (LIPITOR) 10 MG tablet Take 1 tablet (10 mg) by mouth daily 90 tablet 0     blood glucose monitoring (HARMEET CONTOUR) test strip Use to test blood sugars twice daily as directed. 100 strip PRN     Lancets (MICROLET) MISC Use to test blood sugars twice daily as directed. 100 each PRN     lisinopril (PRINIVIL/ZESTRIL) 5 MG tablet Take 1 tablet (5 mg) by mouth daily 90 tablet 0     metFORMIN (GLUCOPHAGE-XR) 500 MG 24 hr tablet Take 2 tablets (1,000 mg) by mouth 2 times daily 360 tablet 0     polyvinyl alcohol (LIQUIFILM TEARS) 1.4 % ophthalmic solution Place 1 drop into the right eye as needed for dry eyes 15 mL 0     acyclovir (ZOVIRAX) 400 MG tablet Take 1 tablet (400 mg) by mouth 5 times daily for 7 days 35 tablet 0     Allergies   Allergen Reactions     Nkda [No Known Drug Allergies]      Recent Labs   Lab Test  "02/12/19  1248 10/16/18  0823 01/16/18  0859  09/16/16  1541  09/23/15  1017 03/11/15  1202 01/09/15  1621   A1C 9.9* 10.1* 9.9*  --  7.9*   < > 9.0*  --  9.4*   LDL  --  64 Cannot estimate LDL when triglyceride exceeds 400 mg/dL  93  --   --   --  108  --   --    HDL  --  33* 29*  --   --   --  34*  --   --    TRIG  --  274* 408*  --   --   --  180*  --   --    ALT  --   --   --   --   --   --  48 56 82*   CR  --  0.62* 0.75   < >  --   --  0.68 0.69 0.76   GFRESTIMATED  --  >90 >90   < >  --   --  >90  Non  GFR Calc   >90  Non  GFR Calc   >90  Non  GFR Calc     GFRESTBLACK  --  >90 >90   < >  --   --  >90   GFR Calc   >90   GFR Calc   >90   GFR Calc     POTASSIUM  --  3.8 4.2   < >  --   --  4.0 3.9 4.6   TSH  --   --   --   --  1.13  --  1.33  --   --     < > = values in this interval not displayed.      BP Readings from Last 3 Encounters:   02/12/19 123/75   01/14/19 123/81   10/16/18 138/89    Wt Readings from Last 3 Encounters:   02/12/19 73.6 kg (162 lb 3.2 oz)   01/14/19 74.4 kg (164 lb)   10/16/18 75.5 kg (166 lb 8 oz)            Labs reviewed in EPIC    Reviewed and updated as needed this visit by clinical staff  Tobacco  Allergies  Meds  Problems  Med Hx  Surg Hx  Fam Hx  Soc Hx        Reviewed and updated as needed this visit by Provider  Tobacco  Allergies  Meds  Problems  Med Hx  Surg Hx  Fam Hx         ROS:  Constitutional, HEENT, cardiovascular, pulmonary, gi and gu systems are negative, except as otherwise noted.    OBJECTIVE:     /75   Pulse 78   Temp 98.4  F (36.9  C) (Oral)   Resp 15   Ht 1.638 m (5' 4.5\")   Wt 73.6 kg (162 lb 3.2 oz)   SpO2 99%   BMI 27.41 kg/m    Body mass index is 27.41 kg/m .  GENERAL APPEARANCE: healthy, alert and no distress     EYES: PERRL, sclera clear     HENT: nose and mouth without ulcers or lesions     NECK: no adenopathy, no asymmetry, masses, " or scars and thyroid normal to palpation     RESP: lungs clear to auscultation - no rales, rhonchi or wheezes     CV: regular rates and rhythm, normal S1 S2, no S3 or S4 and no murmur, click or rub      Abdomen: soft, nontender to palpation except localized tenderness to mild palpation ~5cm lateral to umbilicus on left side, also with abnl sensation to light tough on lateral left side, no HSM or masses and bowel sounds normal     Ext: warm, dry, no edema          Hemoglobin A1C   Date Value Ref Range Status   02/12/2019 9.9 (H) 0 - 5.6 % Final     Comment:     Normal <5.7% Prediabetes 5.7-6.4%  Diabetes 6.5% or higher - adopted from ADA   consensus guidelines.     10/16/2018 10.1 (H) 0 - 5.6 % Final     Comment:     Normal <5.7% Prediabetes 5.7-6.4%  Diabetes 6.5% or higher - adopted from ADA   consensus guidelines.     01/16/2018 9.9 (H) 4.3 - 6.0 % Final         ASSESSMENT/PLAN:       ICD-10-CM    1. Type 2 diabetes mellitus without complication, without long-term current use of insulin (H) E11.9 Hemoglobin A1c     metFORMIN (GLUCOPHAGE-XR) 500 MG 24 hr tablet     lisinopril (PRINIVIL/ZESTRIL) 5 MG tablet   2. Microalbuminuria R80.9 lisinopril (PRINIVIL/ZESTRIL) 5 MG tablet   3. Abdominal wall pain in periumbilical region R10.33 CT Abdomen w/o Contrast     DM/microalbumin- A1C almost the same (going down from 10.1 to 9.9) despite pt taking the metformin 1500mg/d for the last few months (since Dec).  Will have him increase his dose to 2000mg/d, and meet with EMMANUELLE/Janelle for next best medication option.  MD f/u in 3 months.    Microalbuminuria- Bp still okay.  Cont lisinopril 5mg/d.   Risks and benefits of medication(s) including potential side effects reviewed with patient.  Questions answered.     Left sided abd pain- localized moderate pain to palpation ~5cm lateral to umbilicus on left side.  Then also has tingling/burning skin sx's wrapping from there around his left side to left back.  No deep pain.  Kidney  labs have been normal.  Constipation was treated with no improvement in sx's, and stools now normal.  Will check Abd CT scan to r/o abd wall hernia or other pathology.  F/u based on results.      Return in about 3 months (around 5/12/2019) for Diabetes, but sooner if symptoms are not improving.      Mago Pantoja MD  [facility]

## 2019-02-12 NOTE — NURSING NOTE
"Chief Complaint   Patient presents with     RECHECK     Diabetes follow up     Ht 1.638 m (5' 4.5\")   BMI 27.72 kg/m   Estimated body mass index is 27.72 kg/m  as calculated from the following:    Height as of this encounter: 1.638 m (5' 4.5\").    Weight as of 1/14/19: 74.4 kg (164 lb).  Medication Reconciliation: complete        Health Maintenance Due Pending Provider Review:  NONE    n/a    Demetria Dumas MA Lead  [facility]  [phone]  "

## 2019-02-21 ENCOUNTER — TELEPHONE (OUTPATIENT)
Dept: PHARMACY | Facility: OTHER | Age: 44
End: 2019-02-21

## 2019-02-21 ENCOUNTER — HOSPITAL ENCOUNTER (OUTPATIENT)
Dept: CT IMAGING | Facility: CLINIC | Age: 44
Discharge: HOME | End: 2019-02-21
Attending: FAMILY MEDICINE | Admitting: FAMILY MEDICINE

## 2019-02-21 DIAGNOSIS — R10.33 ABDOMINAL WALL PAIN IN PERIUMBILICAL REGION: ICD-10-CM

## 2019-02-21 LAB
CREAT BLD-MCNC: 0.8 MG/DL (ref 0.66–1.25)
GFR SERPL CREATININE-BSD FRML MDRD: >90 ML/MIN/{1.73_M2}

## 2019-02-21 PROCEDURE — 74177 CT ABD & PELVIS W/CONTRAST: CPT

## 2019-02-21 PROCEDURE — 25000128 H RX IP 250 OP 636: Performed by: FAMILY MEDICINE

## 2019-02-21 PROCEDURE — 82565 ASSAY OF CREATININE: CPT

## 2019-02-21 PROCEDURE — 25000125 ZZHC RX 250: Performed by: FAMILY MEDICINE

## 2019-02-21 RX ORDER — IOPAMIDOL 755 MG/ML
79 INJECTION, SOLUTION INTRAVASCULAR ONCE
Status: COMPLETED | OUTPATIENT
Start: 2019-02-21 | End: 2019-02-21

## 2019-02-21 RX ADMIN — IOPAMIDOL 79 ML: 755 INJECTION, SOLUTION INTRAVENOUS at 11:05

## 2019-02-21 RX ADMIN — SODIUM CHLORIDE 63 ML: 9 INJECTION, SOLUTION INTRAVENOUS at 11:05

## 2019-02-21 NOTE — TELEPHONE ENCOUNTER
MTM referral from: [facility] visit (referral by provider)    MTM referral outreach attempt #2 on February 21, 2019 at 1:32 PM      Outcome: Spoke with patient and he is still waiting to hear back on his insurance. He will call to schedule an appointment when insurance is figured out.     Joanie Paz, MT Coordinator Intern

## 2019-03-11 PROBLEM — K76.0 FATTY LIVER: Status: ACTIVE | Noted: 2019-03-11

## 2019-03-11 NOTE — RESULT ENCOUNTER NOTE
Called to discuss Abd ct scan- no etiology found for his left sided abd wall pain.  Did see fatty liver on his abd CT scan- will plan to get more labs at his 5/19 appt.  Pt notes that his left-sided abd pain has been much better- unsure if from diet changes.  CW

## 2019-05-24 ENCOUNTER — TELEPHONE (OUTPATIENT)
Dept: FAMILY MEDICINE | Facility: CLINIC | Age: 44
End: 2019-05-24

## 2019-05-24 NOTE — TELEPHONE ENCOUNTER
Summary:    Patient is due/failing the following:   A1C    Type of outreach:  Phone, left message for patient to call back.   Action needed: Patient needs office visit for diabetes.    If need for provider review:    Please indicate OV, lab, MTM, or nurse appt if needed.  Indicate fasting or not fasting.                                                                                                                                          Amos Gonzalez ma

## 2019-11-15 ENCOUNTER — PATIENT OUTREACH (OUTPATIENT)
Dept: CARE COORDINATION | Facility: CLINIC | Age: 44
End: 2019-11-15

## 2019-11-15 ENCOUNTER — OFFICE VISIT (OUTPATIENT)
Dept: FAMILY MEDICINE | Facility: CLINIC | Age: 44
End: 2019-11-15

## 2019-11-15 VITALS
RESPIRATION RATE: 14 BRPM | TEMPERATURE: 97.7 F | HEART RATE: 81 BPM | BODY MASS INDEX: 26.99 KG/M2 | DIASTOLIC BLOOD PRESSURE: 90 MMHG | WEIGHT: 162 LBS | SYSTOLIC BLOOD PRESSURE: 143 MMHG | OXYGEN SATURATION: 98 % | HEIGHT: 65 IN

## 2019-11-15 DIAGNOSIS — E78.5 HYPERLIPIDEMIA LDL GOAL <100: ICD-10-CM

## 2019-11-15 DIAGNOSIS — L85.3 DRY SKIN: ICD-10-CM

## 2019-11-15 DIAGNOSIS — R80.9 MICROALBUMINURIA: ICD-10-CM

## 2019-11-15 DIAGNOSIS — E11.9 TYPE 2 DIABETES MELLITUS WITHOUT COMPLICATION, WITHOUT LONG-TERM CURRENT USE OF INSULIN (H): Primary | ICD-10-CM

## 2019-11-15 LAB
ANION GAP SERPL CALCULATED.3IONS-SCNC: 6 MMOL/L (ref 3–14)
BUN SERPL-MCNC: 13 MG/DL (ref 7–30)
CALCIUM SERPL-MCNC: 9.2 MG/DL (ref 8.5–10.1)
CHLORIDE SERPL-SCNC: 102 MMOL/L (ref 94–109)
CHOLEST SERPL-MCNC: 211 MG/DL
CO2 SERPL-SCNC: 27 MMOL/L (ref 20–32)
CREAT SERPL-MCNC: 0.74 MG/DL (ref 0.66–1.25)
CREAT UR-MCNC: 179 MG/DL
GFR SERPL CREATININE-BSD FRML MDRD: >90 ML/MIN/{1.73_M2}
GLUCOSE SERPL-MCNC: 197 MG/DL (ref 70–99)
HBA1C MFR BLD: 10.6 % (ref 0–5.6)
HDLC SERPL-MCNC: 44 MG/DL
LDLC SERPL CALC-MCNC: 147 MG/DL
MICROALBUMIN UR-MCNC: 56 MG/L
MICROALBUMIN/CREAT UR: 31.06 MG/G CR (ref 0–17)
NONHDLC SERPL-MCNC: 167 MG/DL
POTASSIUM SERPL-SCNC: 3.9 MMOL/L (ref 3.4–5.3)
SODIUM SERPL-SCNC: 135 MMOL/L (ref 133–144)
TRIGL SERPL-MCNC: 100 MG/DL
TSH SERPL DL<=0.005 MIU/L-ACNC: 1.38 MU/L (ref 0.4–4)

## 2019-11-15 PROCEDURE — 36415 COLL VENOUS BLD VENIPUNCTURE: CPT | Performed by: FAMILY MEDICINE

## 2019-11-15 PROCEDURE — 82043 UR ALBUMIN QUANTITATIVE: CPT | Performed by: FAMILY MEDICINE

## 2019-11-15 PROCEDURE — 80061 LIPID PANEL: CPT | Performed by: FAMILY MEDICINE

## 2019-11-15 PROCEDURE — 83036 HEMOGLOBIN GLYCOSYLATED A1C: CPT | Performed by: FAMILY MEDICINE

## 2019-11-15 PROCEDURE — 99213 OFFICE O/P EST LOW 20 MIN: CPT | Performed by: FAMILY MEDICINE

## 2019-11-15 PROCEDURE — 84443 ASSAY THYROID STIM HORMONE: CPT | Performed by: FAMILY MEDICINE

## 2019-11-15 PROCEDURE — 80048 BASIC METABOLIC PNL TOTAL CA: CPT | Performed by: FAMILY MEDICINE

## 2019-11-15 RX ORDER — ATORVASTATIN CALCIUM 10 MG/1
10 TABLET, FILM COATED ORAL DAILY
Qty: 90 TABLET | Refills: 0 | Status: SHIPPED | OUTPATIENT
Start: 2019-11-15 | End: 2019-12-02

## 2019-11-15 RX ORDER — LISINOPRIL 5 MG/1
5 TABLET ORAL DAILY
Qty: 90 TABLET | Refills: 0 | Status: SHIPPED | OUTPATIENT
Start: 2019-11-15 | End: 2019-12-02

## 2019-11-15 RX ORDER — METFORMIN HCL 500 MG
1000 TABLET, EXTENDED RELEASE 24 HR ORAL 2 TIMES DAILY
Qty: 360 TABLET | Refills: 0 | Status: SHIPPED | OUTPATIENT
Start: 2019-11-15 | End: 2019-12-02

## 2019-11-15 ASSESSMENT — MIFFLIN-ST. JEOR: SCORE: 1538.77

## 2019-11-15 ASSESSMENT — PAIN SCALES - GENERAL: PAINLEVEL: NO PAIN (0)

## 2019-11-15 NOTE — PROGRESS NOTES
Subjective   Lars Daley is a 44 year old male who presents to clinic today for the following health issues:    HPI   Diabetes Follow-up    How often are you checking your blood sugar? Not at all    What concerns do you have today about your diabetes? None     Do you have any of these symptoms? (Select all that apply)  No numbness or tingling in feet.  No redness, sores or blisters on feet.  No complaints of excessive thirst.  No reports of blurry vision.  No significant changes to weight.     Have you had a diabetic eye exam in the last 12 months? No    Pt is and has been off insurance, doing self-pay currently.  Looking at insurance options, but hasn't made much progress.    Not taking most of his meds due to cost- just taking metformin 1500mg/d.    He remembers the full three tabs (1500mg) ~4-5d/wk, other days, 1-2 pills/day.    Pretty consistent for the past three months.    Doing fine on that dose of metformin.  Denies side effects.    [facility] meds are still expensive- metformin $57 for 3mo, lipitor ~$87 for 3 months, and lisinopril was also expensive.  Hasn't taken the lipitor or lisinopril for a long time due to the cost.    Hasn't checked his BP on his own.  BP on recheck today- 136/86.        BP Readings from Last 2 Encounters:   11/15/19 (!) 143/90   02/12/19 123/75     Hemoglobin A1C (%)   Date Value   11/15/2019 10.6 (H)   02/12/2019 9.9 (H)     LDL Cholesterol Calculated (mg/dL)   Date Value   11/15/2019 147 (H)   10/16/2018 64       Diabetes Management Resources      How many servings of fruits and vegetables do you eat daily?  2-3    On average, how many sweetened beverages do you drink each day (soda, juice, sweet tea, etc)?   1-2 cup of coffee and pop  More so water throughout the day  How many days per week do you miss taking your medication? many    What makes it hard for you to take your medications?  cost of medication          Patient Active Problem List   Diagnosis     Hyperlipidemia LDL goal  <100     Type 2 diabetes mellitus without complication, without long-term current use of insulin (H)     Plantar warts     Tension headache     Microalbuminuria     Bell's palsy     Fatty liver     Past Surgical History:   Procedure Laterality Date     STRESS ECHO (METRO)  11    normal       Social History     Tobacco Use     Smoking status: Never Smoker     Smokeless tobacco: Never Used   Substance Use Topics     Alcohol use: Yes     Alcohol/week: 0.0 standard drinks     Comment: socially     Family History   Problem Relation Age of Onset     Heart Disease Maternal Uncle 43        By pass surgery     Diabetes Mother 40     Coronary Artery Disease Mother 59        In [place], blockages in heart, upcoming surgery, ? stent?     C.A.D. Maternal Uncle 49         of MI, no DM, no cigs         Current Outpatient Medications   Medication Sig Dispense Refill     aspirin (ASA) 81 MG tablet Take 1 tablet (81 mg) by mouth daily 90 tablet 3     atorvastatin (LIPITOR) 10 MG tablet Take 1 tablet (10 mg) by mouth daily 90 tablet 0     lisinopril (PRINIVIL/ZESTRIL) 5 MG tablet Take 1 tablet (5 mg) by mouth daily 90 tablet 0     metFORMIN (GLUCOPHAGE-XR) 500 MG 24 hr tablet Take 2 tablets (1,000 mg) by mouth 2 times daily 360 tablet 0     acyclovir (ZOVIRAX) 400 MG tablet Take 1 tablet (400 mg) by mouth 5 times daily for 7 days 35 tablet 0     blood glucose monitoring (HARMEET CONTOUR) test strip Use to test blood sugars twice daily as directed. (Patient not taking: Reported on 11/15/2019) 100 strip PRN     Lancets (MICROLET) MISC Use to test blood sugars twice daily as directed. (Patient not taking: Reported on 11/15/2019) 100 each PRN     polyvinyl alcohol (LIQUIFILM TEARS) 1.4 % ophthalmic solution Place 1 drop into the right eye as needed for dry eyes (Patient not taking: Reported on 11/15/2019) 15 mL 0     Allergies   Allergen Reactions     Nkda [No Known Drug Allergies]      Recent Labs   Lab Test 11/15/19  7377  "02/21/19  1100 02/12/19  1248 10/16/18  0823 01/16/18  0859  09/16/16  1541  09/23/15  1017 03/11/15  1202 01/09/15  1621   A1C 10.6*  --  9.9* 10.1* 9.9*  --  7.9*   < > 9.0*  --  9.4*   *  --   --  64 Cannot estimate LDL when triglyceride exceeds 400 mg/dL  93  --   --   --  108  --   --    HDL 44  --   --  33* 29*  --   --   --  34*  --   --    TRIG 100  --   --  274* 408*  --   --   --  180*  --   --    ALT  --   --   --   --   --   --   --   --  48 56 82*   CR 0.74  --   --  0.62* 0.75   < >  --   --  0.68 0.69 0.76   GFRESTIMATED >90 >90  --  >90 >90   < >  --   --  >90  Non  GFR Calc   >90  Non  GFR Calc   >90  Non  GFR Calc     GFRESTBLACK >90 >90  --  >90 >90   < >  --   --  >90   GFR Calc   >90   GFR Calc   >90   GFR Calc     POTASSIUM 3.9  --   --  3.8 4.2   < >  --   --  4.0 3.9 4.6   TSH 1.38  --   --   --   --   --  1.13  --  1.33  --   --     < > = values in this interval not displayed.      BP Readings from Last 3 Encounters:   11/15/19 (!) 143/90   02/12/19 123/75   01/14/19 123/81    Wt Readings from Last 3 Encounters:   11/15/19 73.5 kg (162 lb)   02/12/19 73.6 kg (162 lb 3.2 oz)   01/14/19 74.4 kg (164 lb)              Reviewed and updated as needed this visit by Provider  Tobacco  Allergies  Meds  Problems  Med Hx  Surg Hx  Fam Hx         Review of Systems   ROS COMP: Constitutional, HEENT, cardiovascular, pulmonary, gi and gu systems are negative, except as otherwise noted.      Objective    BP (!) 143/90 (BP Location: Right arm, Patient Position: Sitting, Cuff Size: Adult Regular)   Pulse 81   Temp 97.7  F (36.5  C) (Oral)   Resp 14   Ht 1.638 m (5' 4.5\")   Wt 73.5 kg (162 lb)   SpO2 98%   BMI 27.38 kg/m    Body mass index is 27.38 kg/m .  Physical Exam   GENERAL APPEARANCE: healthy, alert and no distress     EYES: PERRL, sclera clear     HENT: nose and mouth without ulcers or " lesions     NECK: no adenopathy, no asymmetry, masses, or scars and thyroid normal to palpation     RESP: lungs clear to auscultation - no rales, rhonchi or wheezes     CV: regular rates and rhythm, normal S1 S2, no S3 or S4 and no murmur, click or rub      Abdomen: soft, nontender, no HSM or masses and bowel sounds normal     Ext: warm, dry, no edema      Psych: full range affect, normal speech and grooming, judgement and insight intact       Diagnostic Test Results:  Labs reviewed in Epic  Results for orders placed or performed in visit on 11/15/19   TSH with free T4 reflex     Status: None   Result Value Ref Range    TSH 1.38 0.40 - 4.00 mU/L   Lipid panel reflex to direct LDL Fasting     Status: Abnormal   Result Value Ref Range    Cholesterol 211 (H) <200 mg/dL    Triglycerides 100 <150 mg/dL    HDL Cholesterol 44 >39 mg/dL    LDL Cholesterol Calculated 147 (H) <100 mg/dL    Non HDL Cholesterol 167 (H) <130 mg/dL   Hemoglobin A1c     Status: Abnormal   Result Value Ref Range    Hemoglobin A1C 10.6 (H) 0 - 5.6 %   Basic metabolic panel  (Ca, Cl, CO2, Creat, Gluc, K, Na, BUN)     Status: Abnormal   Result Value Ref Range    Sodium 135 133 - 144 mmol/L    Potassium 3.9 3.4 - 5.3 mmol/L    Chloride 102 94 - 109 mmol/L    Carbon Dioxide 27 20 - 32 mmol/L    Anion Gap 6 3 - 14 mmol/L    Glucose 197 (H) 70 - 99 mg/dL    Urea Nitrogen 13 7 - 30 mg/dL    Creatinine 0.74 0.66 - 1.25 mg/dL    GFR Estimate >90 >60 mL/min/[1.73_m2]    GFR Estimate If Black >90 >60 mL/min/[1.73_m2]    Calcium 9.2 8.5 - 10.1 mg/dL   Albumin Random Urine Quantitative with Creat Ratio     Status: Abnormal   Result Value Ref Range    Creatinine Urine 179 mg/dL    Albumin Urine mg/L 56 mg/L    Albumin Urine mg/g Cr 31.06 (H) 0 - 17 mg/g Cr           Assessment & Plan       ICD-10-CM    1. Type 2 diabetes mellitus without complication, without long-term current use of insulin (H) E11.9 TSH with free T4 reflex     CARE COORDINATION REFERRAL      "Lipid panel reflex to direct LDL Fasting     Hemoglobin A1c     Basic metabolic panel  (Ca, Cl, CO2, Creat, Gluc, K, Na, BUN)     Albumin Random Urine Quantitative with Creat Ratio     lisinopril (PRINIVIL/ZESTRIL) 5 MG tablet     metFORMIN (GLUCOPHAGE-XR) 500 MG 24 hr tablet     aspirin (ASA) 81 MG tablet   2. Hyperlipidemia LDL goal <100 E78.5 Lipid panel reflex to direct LDL Fasting     atorvastatin (LIPITOR) 10 MG tablet   3. Microalbuminuria R80.9 Albumin Random Urine Quantitative with Creat Ratio     lisinopril (PRINIVIL/ZESTRIL) 5 MG tablet   4. Dry skin L85.3      DM II- uncontrolled DM II.  Pt without insurance, and so hasn't been taking meds other than metformin 1500mg/day most days.  No lisinopril or lipitor use 'for awhile'.  Given [facility] prescription program number- he will call asap.  Also referred to Care Coordination to see if there are other programs that could help him, and to ensure f/u.  Will get labs today, though they will be off due to not being on meds.    Pt will get flu shot when he is feeling better.  For dry skin/facial rash, sounds like eczema, rec moisturizer daily and after showers, steroid cream and humidifier.    Return in about 3 months (around 2/15/2020) for Diabetes.  Sooner if/when able to get back on insurance.       BMI:   Estimated body mass index is 27.38 kg/m  as calculated from the following:    Height as of this encounter: 1.638 m (5' 4.5\").    Weight as of this encounter: 73.5 kg (162 lb).   Weight management plan: Discussed healthy diet and exercise guidelines        Mago Pantoja MD  [facility]      "

## 2019-11-15 NOTE — PROGRESS NOTES
The Clinic Community Health Worker spoke with  the patient today to discuss possible Clinic Care Coordination enrollment.  The service was described to the patient and immediate needs were discussed.  The patient agreed to enrollment and an assessment was scheduled.  The patient was provided with contact information for the clinic CHW.             Assessment date: 11/22

## 2019-11-22 ENCOUNTER — PATIENT OUTREACH (OUTPATIENT)
Dept: CARE COORDINATION | Facility: CLINIC | Age: 44
End: 2019-11-22

## 2019-11-22 NOTE — TELEPHONE ENCOUNTER
Community Health Worker Delegation:  Due Date: Within 2 weeks from today's date 11/22/2019    Delegation: No Show for RN Appointment. Please Follow unsuccessful outreach, no show standard work.    Writer called patient and left 2 voice mails.  Please call patient to reschedule.      Info needed for next assessment:      [email or web address]         Warm

## 2019-11-25 ENCOUNTER — PATIENT OUTREACH (OUTPATIENT)
Dept: CARE COORDINATION | Facility: CLINIC | Age: 44
End: 2019-11-25

## 2019-11-25 NOTE — PROGRESS NOTES
Scheduled Follow Up Call: Attempt 1 Community Health Worker called and left a message for the patient. If the patient is returning my call, please transfer the patient to [facility] at [phone].  Attempting to reschedule SN Assessment from no show on 11/22.

## 2019-11-26 ENCOUNTER — PATIENT OUTREACH (OUTPATIENT)
Dept: CARE COORDINATION | Facility: CLINIC | Age: 44
End: 2019-11-26

## 2019-11-26 NOTE — PROGRESS NOTES
The Clinic Community Health Worker spoke with  the patient today to discuss possible Clinic Care Coordination enrollment.  The service was described to the patient and immediate needs were discussed.  The patient agreed to enrollment and an assessment was re-scheduled.  Initial Assessment on 11/22 was a no show. Patient has a difficult time answering his phone at work.   The patient was provided with contact information for the clinic CHW.             Assessment date: 12/6 at 9am

## 2019-12-02 ENCOUNTER — TELEPHONE (OUTPATIENT)
Dept: FAMILY MEDICINE | Facility: CLINIC | Age: 44
End: 2019-12-02

## 2019-12-02 DIAGNOSIS — E78.5 HYPERLIPIDEMIA LDL GOAL <100: ICD-10-CM

## 2019-12-02 DIAGNOSIS — E11.9 TYPE 2 DIABETES MELLITUS WITHOUT COMPLICATION, WITHOUT LONG-TERM CURRENT USE OF INSULIN (H): ICD-10-CM

## 2019-12-02 DIAGNOSIS — R80.9 MICROALBUMINURIA: ICD-10-CM

## 2019-12-02 RX ORDER — METFORMIN HCL 500 MG
1000 TABLET, EXTENDED RELEASE 24 HR ORAL 2 TIMES DAILY
Qty: 360 TABLET | Refills: 0 | Status: SHIPPED | OUTPATIENT
Start: 2019-12-02 | End: 2020-12-15

## 2019-12-02 RX ORDER — ATORVASTATIN CALCIUM 10 MG/1
10 TABLET, FILM COATED ORAL DAILY
Qty: 90 TABLET | Refills: 0 | Status: SHIPPED | OUTPATIENT
Start: 2019-12-02 | End: 2020-12-15

## 2019-12-02 RX ORDER — LISINOPRIL 5 MG/1
5 TABLET ORAL DAILY
Qty: 90 TABLET | Refills: 0 | Status: SHIPPED | OUTPATIENT
Start: 2019-12-02 | End: 2020-12-15

## 2019-12-02 NOTE — RESULT ENCOUNTER NOTE
Called pt re: abnl labs and to see if he was making any progress getting back on his medications.  --His labs were not good- A1C was up in 10's.  He had said he was taking the metformin semi-consistently at his appt, but now states that he ran out in 10/19.  He has not been taking the lipitor, and LDL in 140s.  He was also not taking the lisinopril, and BP was elevated and microalbumin was also elevated, GFR okay.  --He has a phone appt set up with Care Coordinator this Friday, but he has not contacted [facility] rx program yet- will call.  --In the meantime, he asked if we could send his rx's to [facility] in [place], as he heard that they might be cheaper there.  Checked GoodRx, and it looks like a couple of the meds may be just $4, and one is $15.  Rx's sent to [facility].  --Asked pt to let us know one way or the other if he is able to restart taking his medications (metformin 2g (4 tabs)/d, lisinopril 5mg/d, and lipitor 10mg/d).  He will try and do this, or may be able to get feedback from CC on Friday.    CW

## 2019-12-02 NOTE — TELEPHONE ENCOUNTER
Called pt re: abnl labs and to see if he was making any progress getting back on his medications.  --His labs were not good- A1C was up in 10's.  He had said he was taking the metformin semi-consistently at his appt, but now states that he ran out in 10/19.  He has not been taking the lipitor, and LDL in 140s.  He was also not taking the lisinopril, and BP was elevated and microalbumin was also elevated, GFR okay.  --He has a phone appt set up with Care Coordinator this Friday, but he has not contacted [facility] rx program yet- will call.  --In the meantime, he asked if we could send his rx's to [facility] in [place], as he heard that they might be cheaper there.  Checked GoodRx, and it looks like a couple of the meds may be just $4, and one is $15.  Rx's sent to [facility].  --Asked pt to let us know one way or the other if he is able to restart taking his medications (metformin 2g (4 tabs)/d, lisinopril 5mg/d, and lipitor 10mg/d).  He will try and do this, or may be able to get feedback from CC on Friday.    CW

## 2019-12-06 ENCOUNTER — PATIENT OUTREACH (OUTPATIENT)
Dept: CARE COORDINATION | Facility: CLINIC | Age: 44
End: 2019-12-06

## 2019-12-06 NOTE — TELEPHONE ENCOUNTER
Community Health Worker Delegation:  Due Date: Within 2 weeks from today's date 12/6/2019    Delegation: No Show for RN Appointment.No Show #2     Please Follow unsuccessful outreach, no show standard work.    Writer called patient and left detailed voicemail via language line.  Writer instructed patient to call [employer] to obtain medical insurance [phone].    CCC referral will be closed at this time due to 2 No Show's.

## 2019-12-09 ENCOUNTER — PATIENT OUTREACH (OUTPATIENT)
Dept: CARE COORDINATION | Facility: CLINIC | Age: 44
End: 2019-12-09

## 2019-12-09 NOTE — PROGRESS NOTES
Per Cora Weber in Chart Review:  CCC referral will be closed at this time due to 2 No Show's.      The Clinic Community Health Worker spoke with  the patient today to discuss possible Clinic Care Coordination enrollment.  Patient stated that he did not receive a VM with [facility] information and would like to set up a face to face for Monday or Tuesday when he does not work.  I notified patient that RN is only available to meet on Thursdays.  Patient said that writer could schedule a phone assessment for him on 12/12.  The patient was provided with contact information for the clinic CHW.             Assessment date: 12/12

## 2019-12-12 ENCOUNTER — PATIENT OUTREACH (OUTPATIENT)
Dept: NURSING | Facility: CLINIC | Age: 44
End: 2019-12-12
Attending: FAMILY MEDICINE

## 2019-12-12 NOTE — PROGRESS NOTES
Clinic Care Coordination Contact    Situation: Patient chart reviewed by care coordinator.    Background: Writer called patient for scheduled 0800 appointment.      Assessment: Writer left detailed VM that included the [employer] Prescription phone number.  The address and phone number to [facility].  [facility] is unable to obtain medical insurance for patient.  He will have to go to [facility] directly or call them directly.    Plan/Recommendations:  No further CCC f/u as this was the patient's 3rd No Show.    CHW please remove patient from your work que.

## 2020-01-10 ENCOUNTER — TELEPHONE (OUTPATIENT)
Dept: FAMILY MEDICINE | Facility: CLINIC | Age: 45
End: 2020-01-10

## 2020-01-10 NOTE — TELEPHONE ENCOUNTER
Called pt   He will go for diabetic eye exam  States he's never had one  Wants to know places he can go  Advised [facility], [employer], somewhere similar  Leanna BOOTHE RN

## 2020-10-27 ENCOUNTER — TELEPHONE (OUTPATIENT)
Dept: FAMILY MEDICINE | Facility: CLINIC | Age: 45
End: 2020-10-27

## 2020-10-27 NOTE — TELEPHONE ENCOUNTER
Panel Management Review      Patient has the following on his problem list:     Diabetes    ASA:     Last A1C  Lab Results   Component Value Date    A1C 10.6 11/15/2019    A1C 9.9 02/12/2019    A1C 10.1 10/16/2018    A1C 9.9 01/16/2018    A1C 7.9 09/16/2016     A1C tested: FAILED    Last LDL:    Lab Results   Component Value Date    CHOL 211 11/15/2019     Lab Results   Component Value Date    HDL 44 11/15/2019     Lab Results   Component Value Date     11/15/2019     Lab Results   Component Value Date    TRIG 100 11/15/2019     Lab Results   Component Value Date    CHOLHDLRATIO 5.2 09/23/2015     Lab Results   Component Value Date    NHDL 167 11/15/2019       Is the patient on a Statin? YES             Is the patient on Aspirin? YES    Medications     HMG CoA Reductase Inhibitors     atorvastatin (LIPITOR) 10 MG tablet       Salicylates     aspirin (ASA) 81 MG tablet             Last three blood pressure readings:  BP Readings from Last 3 Encounters:   11/15/19 (!) 143/90   02/12/19 123/75   01/14/19 123/81       Date of last diabetes office visit: 11/15/2019     Tobacco History:     History   Smoking Status     Never Smoker   Smokeless Tobacco     Never Used           Composite cancer screening  Chart review shows that this patient is due/due soon for the following None  Summary:    Patient is due/failing the following:   A1C and PHYSICAL    Action needed:   Patient needs office visit for physical and diabetes check.    Type of outreach:    Sent letter.    Questions for provider review:    None                                                                                                                                    Camelia Freire MA on 10/27/2020 at 3:34 PM       Chart routed to  .

## 2020-10-27 NOTE — LETTER
October 27, 2020    Lars Daley  [address] [place] [address]    Dear Mary Sousa cares about your health and your health plan.  I have reviewed your medical conditions, medication list and lab results, and am making recommendations based on this review to better manage your health.    You are in particular need of attention regarding:  -Diabetes  -Wellness (Physical) Visit     I am recommending that you:     -schedule a FOLLOWUP OFFICE APPOINTMENT FOR DIABETES with me.  I will recheck your: A1c, Lipids (fasting cholesterol - nothing to eat except water and/or meds for 8+ hours), BMP (basic metabolic panel) and Microablumin tests.    You are also due for your diabetic eye exam - if you had this completed this year, please let us know when and where you had the exam or have them fax/send the records or reports to us.     -schedule a WELLNESS (Physical) APPOINTMENT with me.   I will check fasting labs the same day - nothing to eat except water and meds for 8-10 hours prior.      Please call us at the [facility] or use "[employer] to address the above recommendations.     Thank you for trusting [facility].  We appreciate the opportunity to serve you and look forward to supporting your healthcare in the future.    If you have (or plan to have) any of these tests done at a facility other than a [facility] or a [facility], please have the results sent to the [facility] location noted above.      Best Regards,    Erick Pantoja MD

## 2020-12-15 ENCOUNTER — PATIENT OUTREACH (OUTPATIENT)
Dept: CARE COORDINATION | Facility: CLINIC | Age: 45
End: 2020-12-15

## 2020-12-15 ENCOUNTER — OFFICE VISIT (OUTPATIENT)
Dept: FAMILY MEDICINE | Facility: CLINIC | Age: 45
End: 2020-12-15

## 2020-12-15 ENCOUNTER — OFFICE VISIT (OUTPATIENT)
Dept: PHARMACY | Facility: CLINIC | Age: 45
End: 2020-12-15

## 2020-12-15 VITALS
HEART RATE: 73 BPM | TEMPERATURE: 97.1 F | SYSTOLIC BLOOD PRESSURE: 150 MMHG | OXYGEN SATURATION: 98 % | DIASTOLIC BLOOD PRESSURE: 92 MMHG | WEIGHT: 163.8 LBS | BODY MASS INDEX: 27.68 KG/M2

## 2020-12-15 DIAGNOSIS — E78.5 HYPERLIPIDEMIA LDL GOAL <100: ICD-10-CM

## 2020-12-15 DIAGNOSIS — R80.9 MICROALBUMINURIA: ICD-10-CM

## 2020-12-15 DIAGNOSIS — E11.9 TYPE 2 DIABETES MELLITUS WITHOUT COMPLICATION, WITHOUT LONG-TERM CURRENT USE OF INSULIN (H): Primary | ICD-10-CM

## 2020-12-15 LAB
ANION GAP SERPL CALCULATED.3IONS-SCNC: 6 MMOL/L (ref 3–14)
BUN SERPL-MCNC: 17 MG/DL (ref 7–30)
CALCIUM SERPL-MCNC: 8.6 MG/DL (ref 8.5–10.1)
CHLORIDE SERPL-SCNC: 105 MMOL/L (ref 94–109)
CHOLEST SERPL-MCNC: 224 MG/DL
CO2 SERPL-SCNC: 23 MMOL/L (ref 20–32)
CREAT SERPL-MCNC: 0.64 MG/DL (ref 0.66–1.25)
CREAT UR-MCNC: 70 MG/DL
GFR SERPL CREATININE-BSD FRML MDRD: >90 ML/MIN/{1.73_M2}
GLUCOSE SERPL-MCNC: 261 MG/DL (ref 70–99)
HBA1C MFR BLD: 10.4 % (ref 0–5.6)
HDLC SERPL-MCNC: 31 MG/DL
LDLC SERPL CALC-MCNC: 116 MG/DL
MICROALBUMIN UR-MCNC: 24 MG/L
MICROALBUMIN/CREAT UR: 33.96 MG/G CR (ref 0–17)
NONHDLC SERPL-MCNC: 193 MG/DL
POTASSIUM SERPL-SCNC: 4 MMOL/L (ref 3.4–5.3)
SODIUM SERPL-SCNC: 134 MMOL/L (ref 133–144)
TRIGL SERPL-MCNC: 384 MG/DL
TSH SERPL DL<=0.005 MIU/L-ACNC: 1.97 MU/L (ref 0.4–4)

## 2020-12-15 PROCEDURE — 80061 LIPID PANEL: CPT | Performed by: FAMILY MEDICINE

## 2020-12-15 PROCEDURE — 80048 BASIC METABOLIC PNL TOTAL CA: CPT | Performed by: FAMILY MEDICINE

## 2020-12-15 PROCEDURE — 99214 OFFICE O/P EST MOD 30 MIN: CPT | Performed by: FAMILY MEDICINE

## 2020-12-15 PROCEDURE — 36415 COLL VENOUS BLD VENIPUNCTURE: CPT | Performed by: FAMILY MEDICINE

## 2020-12-15 PROCEDURE — 99207 PR NO CHARGE LOS: CPT | Performed by: PHARMACIST

## 2020-12-15 PROCEDURE — 84443 ASSAY THYROID STIM HORMONE: CPT | Performed by: FAMILY MEDICINE

## 2020-12-15 PROCEDURE — 82043 UR ALBUMIN QUANTITATIVE: CPT | Performed by: FAMILY MEDICINE

## 2020-12-15 PROCEDURE — 83036 HEMOGLOBIN GLYCOSYLATED A1C: CPT | Performed by: FAMILY MEDICINE

## 2020-12-15 RX ORDER — ATORVASTATIN CALCIUM 20 MG/1
10 TABLET, FILM COATED ORAL DAILY
Qty: 45 TABLET | Refills: 0 | Status: SHIPPED | OUTPATIENT
Start: 2020-12-15 | End: 2021-03-10

## 2020-12-15 RX ORDER — LISINOPRIL 5 MG/1
5 TABLET ORAL DAILY
Qty: 90 TABLET | Refills: 0 | Status: SHIPPED | OUTPATIENT
Start: 2020-12-15 | End: 2021-03-10

## 2020-12-15 RX ORDER — ATORVASTATIN CALCIUM 10 MG/1
10 TABLET, FILM COATED ORAL DAILY
Qty: 90 TABLET | Refills: 0 | Status: SHIPPED | OUTPATIENT
Start: 2020-12-15 | End: 2020-12-15

## 2020-12-15 RX ORDER — GLIPIZIDE 5 MG/1
5 TABLET, FILM COATED, EXTENDED RELEASE ORAL DAILY
Qty: 90 TABLET | Refills: 1 | Status: SHIPPED | OUTPATIENT
Start: 2020-12-15 | End: 2021-09-29

## 2020-12-15 RX ORDER — METFORMIN HCL 500 MG
1000 TABLET, EXTENDED RELEASE 24 HR ORAL 2 TIMES DAILY
Qty: 360 TABLET | Refills: 0 | Status: SHIPPED | OUTPATIENT
Start: 2020-12-15 | End: 2021-03-10

## 2020-12-15 NOTE — PROGRESS NOTES
Clinical Pharmacy Consult:                                                    Lars Daley is a 45 year old male coming in for a clinical pharmacist consult.  He was referred to me from Dr. Pantoja.     Reason for Consult: Pt is currently unemployed and does not have any insurance. Needs help affording meds.     Discussion: He currently uses a [facility] - reviewed coupons available and it seems [facility] the [facility] and most convenient way to go. Also discussed referral to  pt assistance program and using [employer] to try to get signed up for MA.     Discussed high A1c with CW - will start glipizide. Can add pioglitazone in the future if needed. Also discussed change in atorvastatin dose - since he is paying cash, atorvastatin is less expensive if he cuts tabs in half.     Plan:  1. Start glipizide ER 5mg daily  2. Change atorvastatin to 20mg - 1/2 tablet daily (called [facility] - they canceled 10mg tab Rx sent earlier)  3. Discussed timing of medication doses (can take all in the AM with food)  4. Gave numbers for MA help and [facility] Pt assistance program.     Janelle Cabezas, JoyceD, URIEL, BCACP  MT[facility] Pharmacist, [facility]

## 2020-12-15 NOTE — PROGRESS NOTES
Subjective     Lars Daley is a 45 year old male who presents to clinic today for the following health issues:    HPI       Diabetes Follow-up    How often are you checking your blood sugar? Not at all    What concerns do you have today about your diabetes? Needs refills out of all (other than metformin) for the past 4-5 months      Do you have any of these symptoms? (Select all that apply)  No numbness or tingling in feet.  No redness, sores or blisters on feet.  No complaints of excessive thirst.  No reports of blurry vision.  No significant changes to weight.    Have you had a diabetic eye exam in the last 12 months? No    Last visit was over a year ago- in 11/19.    He ran out of his metformin, but was able to take the meds from a family member- so is still taking 1500mg/day of metfromin.  A1C still very high in 10s again today- despite taking the metformin consistently.    He ran out of the strips, so isn't checking his glucose levels.    He also did run out of lipitor and lisinopril.  States he tried to call, but had a hard time with the wait times with the phones.    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  States he hasn't been getting unemployment, and doesn't have insurance now.  Does have some savings.    BP Readings from Last 2 Encounters:   12/15/20 (!) 150/92   11/15/19 (!) 143/90     Hemoglobin A1C (%)   Date Value   12/15/2020 10.4 (H)   11/15/2019 10.6 (H)     LDL Cholesterol Calculated (mg/dL)   Date Value   12/15/2020 116 (H)   11/15/2019 147 (H)           Hyperlipidemia Follow-Up    Are you regularly taking any medication or supplement to lower your cholesterol?   No  Ran out of the lipitor      Are you having muscle aches or other side effects that you think could be caused by your cholesterol lowering medication?  No    How many servings of fruits and vegetables do you eat daily?  4 or more    On average, how many sweetened beverages do you drink each day (Examples: soda,  juice, sweet tea, etc.  Do NOT count diet or artificially sweetened beverages)?   1    How many days per week do you exercise enough to make your heart beat faster? 7    How many minutes a day do you exercise enough to make your heart beat faster? 60 or more from working  How many days per week do you miss taking your medication? 3 months     What makes it hard for you to take your medications?  ran out of medication and due for visit        Review of Systems   Constitutional, HEENT, cardiovascular, pulmonary, gi and gu systems are negative, except as otherwise noted.      Objective    BP (!) 150/92   Pulse 73   Temp 97.1  F (36.2  C) (Tympanic)   Wt 74.3 kg (163 lb 12.8 oz)   SpO2 98%   BMI 27.68 kg/m    Body mass index is 27.68 kg/m .  Physical Exam   GENERAL APPEARANCE: healthy, alert and no distress     EYES: PERRL, sclera clear     HENT: nose and mouth without ulcers or lesions     NECK: no adenopathy, no asymmetry, masses, or scars and thyroid normal to palpation     RESP: lungs clear to auscultation - no rales, rhonchi or wheezes     CV: regular rates and rhythm, normal S1 S2, no S3 or S4 and no murmur, click or rub      Abdomen: soft, nontender, no HSM or masses and bowel sounds normal     Ext: warm, dry, no edema       Results for orders placed or performed in visit on 12/15/20   Lipid panel reflex to direct LDL Fasting     Status: Abnormal   Result Value Ref Range    Cholesterol 224 (H) <200 mg/dL    Triglycerides 384 (H) <150 mg/dL    HDL Cholesterol 31 (L) >39 mg/dL    LDL Cholesterol Calculated 116 (H) <100 mg/dL    Non HDL Cholesterol 193 (H) <130 mg/dL   Basic metabolic panel  (Ca, Cl, CO2, Creat, Gluc, K, Na, BUN)     Status: Abnormal   Result Value Ref Range    Sodium 134 133 - 144 mmol/L    Potassium 4.0 3.4 - 5.3 mmol/L    Chloride 105 94 - 109 mmol/L    Carbon Dioxide 23 20 - 32 mmol/L    Anion Gap 6 3 - 14 mmol/L    Glucose 261 (H) 70 - 99 mg/dL    Urea Nitrogen 17 7 - 30 mg/dL     Creatinine 0.64 (L) 0.66 - 1.25 mg/dL    GFR Estimate >90 >60 mL/min/[1.73_m2]    GFR Estimate If Black >90 >60 mL/min/[1.73_m2]    Calcium 8.6 8.5 - 10.1 mg/dL   Hemoglobin A1c     Status: Abnormal   Result Value Ref Range    Hemoglobin A1C 10.4 (H) 0 - 5.6 %   TSH with free T4 reflex     Status: None   Result Value Ref Range    TSH 1.97 0.40 - 4.00 mU/L   Albumin Random Urine Quantitative with Creat Ratio     Status: Abnormal   Result Value Ref Range    Creatinine Urine 70 mg/dL    Albumin Urine mg/L 24 mg/L    Albumin Urine mg/g Cr 33.96 (H) 0 - 17 mg/g Cr           Assessment & Plan       ICD-10-CM    1. Type 2 diabetes mellitus without complication, without long-term current use of insulin (H)  E11.9 Lipid panel reflex to direct LDL Fasting     Basic metabolic panel  (Ca, Cl, CO2, Creat, Gluc, K, Na, BUN)     Hemoglobin A1c     TSH with free T4 reflex     Albumin Random Urine Quantitative with Creat Ratio     CARE COORDINATION REFERRAL     lisinopril (ZESTRIL) 5 MG tablet     metFORMIN (GLUCOPHAGE-XR) 500 MG 24 hr tablet     blood glucose (NO BRAND SPECIFIED) test strip   2. Hyperlipidemia LDL goal <100  E78.5 Lipid panel reflex to direct LDL Fasting     CARE COORDINATION REFERRAL     DISCONTINUED: atorvastatin (LIPITOR) 10 MG tablet   3. Microalbuminuria  R80.9 Basic metabolic panel  (Ca, Cl, CO2, Creat, Gluc, K, Na, BUN)     Albumin Random Urine Quantitative with Creat Ratio     CARE COORDINATION REFERRAL     lisinopril (ZESTRIL) 5 MG tablet      DM II-   Pt not seen in over a year- states he didn't think we were seeing people in the office, and ran out of meds, and had trouble with the phones so did not ask for extensions of the meds.  He had a supply of metformin from a family member, so is still on the 1500mg/d of the metformin- not taking other meds.  A1C still in 10s despite continuing on the metformin, so had him meet with MTM today.  She planned to go through [employer] with him to find lower cost options,  and also added glipizide 5mg/d.    Will also send another referral for care coordination, due to his lack of insurance and financial concerns.  Asked that they be persistent in trying to reach him.  May benefit in using a  for a discussion over phone.    Lipids/HTN- off lipitor and lisinopril, so will restart these at his old doses - may need to be adjusted.  Will check fasting labs as above.          Return in about 6 weeks (around 1/26/2021) for Diabetes, Blood Pressure Recheck, Fasting labs at appointment.    Mago Pantoja MD  [facility]

## 2020-12-15 NOTE — PROGRESS NOTES
Clinic Care Coordination Contact  Community Health Worker Initial Outreach    CHW Initial Information Gathering:  Current living arrangement:: I live in a private home with spouse  Type of residence:: Private home - stairs  Community Resources: None  Supplies used at home:: None  Equipment Currently Used at Home: glucometer  Informal Support system:: Spouse  Transportation means:: Regular car  CHW Additional Questions  MyChart active?: No  Patient agreeable to assistance with activating MyChart?: Yes(Email added to chart today and Mychart message requested)    Patient accepts CC: Yes. Patient scheduled for assessment with SW on 12/21/2020 at 10am . Patient noted desire to discuss Resources and support.       Reason for Referral: Financial Support: Insurance, Medication Affordability and loss of income.  Patient referred last year, and he stated he tried to make an appointment, but they were busy, and it never worked out. He did miss some calls when he was at work and was unable to answer. Tried to call back, but they didn't answer.  Additional pertinent details: Please remain persistent in trying to reach him. Multiple needs to help him care for diabetes effectively.

## 2020-12-15 NOTE — LETTER
December 24, 2020      Lars Daley  [address] [place] [id number]        Dear ,    Here are your lab results from your recent visit...     --Your hemoglobin A1C (the three month average of your glucose levels) was high as we discussed.     --Your microalbumin level (which is the urine test that can signal signs of early chronic kidney disease if elevated to >30) is still a bit elevated likely due to your diabetes being uncontrolled.     --Your TSH (thyroid stimulating hormone, which is elevated in hypothyroidism, and lowered in hyperthyroidism) looks normal.     --Your cholesterol panel looks okay with a lower LDL (the bad cholesterol) and a slightly low HDL (the good cholesterol).  Your triglycerides are high again, likely related to the diabetes.     --Your basic metabolic panel (which includes electrolyte levels, blood sugar level and kidney function tests) looks pretty good except for the high glucose.     Resulted Orders   Lipid panel reflex to direct LDL Fasting   Result Value Ref Range    Cholesterol 224 (H) <200 mg/dL      Comment:      Desirable:       <200 mg/dl    Triglycerides 384 (H) <150 mg/dL      Comment:      Borderline high:  150-199 mg/dl  High:             200-499 mg/dl  Very high:       >499 mg/dl  Fasting specimen      HDL Cholesterol 31 (L) >39 mg/dL    LDL Cholesterol Calculated 116 (H) <100 mg/dL      Comment:      Above desirable:  100-129 mg/dl  Borderline High:  130-159 mg/dL  High:             160-189 mg/dL  Very high:       >189 mg/dl      Non HDL Cholesterol 193 (H) <130 mg/dL      Comment:      Above Desirable:  130-159 mg/dl  Borderline high:  160-189 mg/dl  High:             190-219 mg/dl  Very high:       >219 mg/dl     Basic metabolic panel  (Ca, Cl, CO2, Creat, Gluc, K, Na, BUN)   Result Value Ref Range    Sodium 134 133 - 144 mmol/L    Potassium 4.0 3.4 - 5.3 mmol/L    Chloride 105 94 - 109 mmol/L    Carbon Dioxide 23 20 - 32 mmol/L    Anion Gap 6 3 - 14 mmol/L     Glucose 261 (H) 70 - 99 mg/dL      Comment:      Fasting specimen    Urea Nitrogen 17 7 - 30 mg/dL    Creatinine 0.64 (L) 0.66 - 1.25 mg/dL    GFR Estimate >90 >60 mL/min/[1.73_m2]      Comment:      Non  GFR Calc  Starting 12/18/2018, serum creatinine based estimated GFR (eGFR) will be   calculated using the Chronic Kidney Disease Epidemiology Collaboration   (CKD-EPI) equation.      GFR Estimate If Black >90 >60 mL/min/[1.73_m2]      Comment:       GFR Calc  Starting 12/18/2018, serum creatinine based estimated GFR (eGFR) will be   calculated using the Chronic Kidney Disease Epidemiology Collaboration   (CKD-EPI) equation.      Calcium 8.6 8.5 - 10.1 mg/dL   Hemoglobin A1c   Result Value Ref Range    Hemoglobin A1C 10.4 (H) 0 - 5.6 %      Comment:      Normal <5.7% Prediabetes 5.7-6.4%  Diabetes 6.5% or higher - adopted from ADA   consensus guidelines.     TSH with free T4 reflex   Result Value Ref Range    TSH 1.97 0.40 - 4.00 mU/L   Albumin Random Urine Quantitative with Creat Ratio   Result Value Ref Range    Creatinine Urine 70 mg/dL    Albumin Urine mg/L 24 mg/L    Albumin Urine mg/g Cr 33.96 (H) 0 - 17 mg/g Cr       Please let me know if you have any questions.   Erick Vivar MD

## 2020-12-15 NOTE — PATIENT INSTRUCTIONS
Recommendations from today's MTM visit:                                                       1. Start taking glipizide 5mg once daily in the morning  2. Start taking Metformin 500mg 4 tablets all at once.   3. You can take all of your medications together in the morning.   For help with insurance:    [facility] Program ([phone]) help with billing for patients without insurance   [facility] ([phone])-Will assist patient s with applying for [place] programs   Application for [place] Care [email or web address] or contact  [facility]  [address] [place] [id number]   Attn: Case Assignment  [phone]  Fax: [phone]    It was great to speak with you today.  I value your experience and would be very thankful for your time with providing feedback on our clinic survey. You may receive a survey via email or text message in the next few days.     Next MTM visit: Janelle will call you in a week    To schedule another MTM appointment, please call the clinic directly or you may call the MTM scheduling line at [phone] or toll-free at [phone].     My Clinical Pharmacist's contact information:                                                      It was a pleasure talking with you today!  Please feel free to contact me with any questions or concerns you have.      Janelle Cabezas, Pharm.D., M.B.A., BCACP  MTM Pharmacist, [facility]  Pager: [phone]  Email: [email or web address]

## 2020-12-22 ENCOUNTER — PATIENT OUTREACH (OUTPATIENT)
Dept: CARE COORDINATION | Facility: CLINIC | Age: 45
End: 2020-12-22

## 2020-12-22 NOTE — PROGRESS NOTES
Clinic Care Coordination Contact  Program: [facility]   [place]: [place] Outreach:  12/22/2020: FRW received a call from patient. We went through the MNsure screening and patient and wife's residency status does not allow them to apply through MNsure or get [place] or MNCare. I discussed [facility] care with patient and he is interested. I emailed an [facility] care application to [email or web address] and will follow up with patient in 2 weeks.   12/22/2020: Outreach attempted x 1. Left message on [employer] with call back information and requested return call.  Plan: FRW will call again within one week.       Health Insurance Screening: MNSURE   1. Do you currently have health insurance, did you receive a renewal? Himself and wife don't have insurance a couple of years ago  2. If you applied through [employer], do you know your username/password? Yes, they didn't accept him.   3. How many people in the household? 5, himself, wife and 3 kids   4. Do you file taxes, who do you file with? Yes, files with wife and claims all 3 kids  5. What is your monthly income (include all tax members)? He works part time $15 30 hours, wife works $16 20-25 hours  6. Do you have access to insurance through an employer (if yes, need EIN)?   7. Do you have social security cards and/or green cards? No    Health Insurance:      Referral/Screening:    Program Interested In:     Insurance:   Is patient requesting help applying for insurance?    YES     Mn-its outcome (insert mn-its here):  Inactive     Screening Questions:   1. Have you recently applied for insurance or do for a renewal? If so, when? NO   2. How many people in your household?  5   3. Do you file taxes, who do you claim? Yes, wife and 3 children   5. What is the monthly gross income for the household (wages, social security, workers comp, and pension)? 3,000     Financial Resource Worker Follow Up    Goals:   Goals Addressed as of 12/22/2020 at 3:20 PM                  Today      Financial Wellbeing- Lj care (pt-stated)   10%    Added 12/21/20 by Viviana Lr BSW     Goal Statement: I would like help paying for medical bills while I don't have insurance over the next three months.   Date Goal set: 12/21/2020  Barriers: unsure where to get help   Strengths: asking for help   Date to Achieve By: 3/21/2020  Patient expressed understanding of goal: yes  Action steps to achieve this goal:  1. I will complete a lj care application   2. I will talk CC team about other resources that will help me.                 Intervention and Education during outreach: n/a    FRW Plan: FRW will make outreach in 2 weeks

## 2020-12-24 NOTE — RESULT ENCOUNTER NOTE
Please send letter below with copy of results.  Thanks! CW    Here are your lab results from your recent visit...  --Your hemoglobin A1C (the three month average of your glucose levels) was high as we discussed.  --Your microalbumin level (which is the urine test that can signal signs of early chronic kidney disease if elevated to >30) is still a bit elevated likely due to your diabetes being uncontrolled.  --Your TSH (thyroid stimulating hormone, which is elevated in hypothyroidism, and lowered in hyperthyroidism) looks normal.  --Your cholesterol panel looks okay with a lower LDL (the bad cholesterol) and a slightly low HDL (the good cholesterol).  Your triglycerides are high again, likely related to the diabetes.  --Your basic metabolic panel (which includes electrolyte levels, blood sugar level and kidney function tests) looks pretty good except for the high glucose.      Please let me know if you have any questions.  Best,   Erick Pantoja MD

## 2021-01-05 ENCOUNTER — PATIENT OUTREACH (OUTPATIENT)
Dept: CARE COORDINATION | Facility: CLINIC | Age: 46
End: 2021-01-05

## 2021-01-05 NOTE — PROGRESS NOTES
Clinic Care Coordination Contact  Program: [facility]   County: [place]      [place] Outreach:  1/5/2021: FRW checked the guarantor account notes and did not see anything noted by [facility]. FRW called patient and he states he just sent the application in today The billing department called him and he let them know he just sent in a lj care application and they will put a hold on his bill for now. FRW will check on the status in 1 week and make outreach.     Closed Encounter:  12/22/2020: FRW received a call from patient. We went through the MNsure screening and patient and wife's residency status does not allow them to apply through MNsure or get [place] or MNCare. I discussed [facility] care with patient and he is interested. I emailed an [facility] care application to [email or web address] and will follow up with patient in 2 weeks.   12/22/2020: Outreach attempted x 1. Left message on [employer] with call back information and requested return call.  Plan: FRW will call again within one week.       Health Insurance Screening: MNSURE   1. Do you currently have health insurance, did you receive a renewal? Himself and wife don't have insurance a couple of years ago  2. If you applied through [employer], do you know your username/password? Yes, they didn't accept him.   3. How many people in the household? 5, himself, wife and 3 kids   4. Do you file taxes, who do you file with? Yes, files with wife and claims all 3 kids  5. What is your monthly income (include all tax members)? He works part time $15 30 hours, wife works $16 20-25 hours  6. Do you have access to insurance through an employer (if yes, need EIN)?   7. Do you have social security cards and/or green cards? No    Health Insurance:      Referral/Screening:    Program Interested In:     Insurance:   Is patient requesting help applying for insurance?    YES     Mn-its outcome (insert mn-its here):  Inactive     Screening Questions:   1. Have you recently  applied for insurance or do for a renewal? If so, when? NO   2. How many people in your household?  5   3. Do you file taxes, who do you claim? Yes, wife and 3 children   5. What is the monthly gross income for the household (wages, social security, workers comp, and pension)? 3,000     Financial Resource Worker Follow Up    Goals:   Goals Addressed as of 1/5/2021 at 12:03 PM                 Today    12/22/20      Financial Wellbeing- Lj care (pt-stated)   50%  10%    Added 12/21/20 by Viviana Lr BSW     Goal Statement: I would like help paying for medical bills while I don't have insurance over the next three months.   Date Goal set: 12/21/2020  Barriers: unsure where to get help   Strengths: asking for help   Date to Achieve By: 3/21/2020  Patient expressed understanding of goal: yes  Action steps to achieve this goal:  1. I will complete a lj care application   2. I will talk CC team about other resources that will help me.                 Intervention and Education during outreach: n/a    FRW Plan: FRW will check on the status and make outreach in 1 week.

## 2021-01-12 ENCOUNTER — PATIENT OUTREACH (OUTPATIENT)
Dept: CARE COORDINATION | Facility: CLINIC | Age: 46
End: 2021-01-12

## 2021-01-12 NOTE — PROGRESS NOTES
Clinic Care Coordination Contact  Program: [facility] (doesn't need ).  County: [place]      FRW Outreach:  1/12/20201: FRW checked the guarantor account notes and lj care is requesting income verification, bank statements, and the federal 1040. FRW called patient and he states he just received the letter from [facility] and he's planning on emailing the documents within the next couple of days. FRW will check notes and make outreach in 2 weeks.     Closed Encounter:  1/5/2021: FRW checked the guarantor account notes and did not see anything noted by [facility]. FRW called patient and he states he just sent the application in today The billing department called him and he let them know he just sent in a lj care application and they will put a hold on his bill for now. FRW will check on the status in 1 week and make outreach.   12/22/2020: FRW received a call from patient. We went through the MNsure screening and patient and wife's residency status does not allow them to apply through [employer] or get MA or MNCare. I discussed [facility] with patient and he is interested. I emailed an [facility] care application to [email or web address] and will follow up with patient in 2 weeks.   12/22/2020: Outreach attempted x 1. Left message on [employer] with call back information and requested return call.  Plan: FRW will call again within one week.       Health Insurance Screening: MNSURE   1. Do you currently have health insurance, did you receive a renewal? Himself and wife don't have insurance a couple of years ago  2. If you applied through [facility] do you know your username/password? Yes, they didn't accept him.   3. How many people in the household? 5, himself, wife and 3 kids   4. Do you file taxes, who do you file with? Yes, files with wife and claims all 3 kids  5. What is your monthly income (include all tax members)? He works part time $15 30 hours, wife works $16 20-25 hours  6. Do you  have access to insurance through an employer (if yes, need EIN)?   7. Do you have social security cards and/or green cards? No    Health Insurance:      Referral/Screening:    Program Interested In:     Insurance:   Is patient requesting help applying for insurance?    YES     Mn-its outcome (insert mn-its here):  Inactive     Screening Questions:   1. Have you recently applied for insurance or do for a renewal? If so, when? NO   2. How many people in your household?  5   3. Do you file taxes, who do you claim? Yes, wife and 3 children   5. What is the monthly gross income for the household (wages, social security, workers comp, and pension)? 3,000     Financial Resource Worker Follow Up    Goals:   Goals Addressed as of 1/12/2021 at 1:14 PM                 Today    1/5/21      Financial Wellbeing- Lj care (pt-stated)   70%  50%    Added 12/21/20 by Viviana Lr, BSW     Goal Statement: I would like help paying for medical bills while I don't have insurance over the next three months.   Date Goal set: 12/21/2020  Barriers: unsure where to get help   Strengths: asking for help   Date to Achieve By: 3/21/2020  Patient expressed understanding of goal: yes  Action steps to achieve this goal:  1. I will complete a lj care application   2. I will talk CC team about other resources that will help me.                 Intervention and Education during outreach: n/a    FRW Plan: FRW will check account and make outreach in 2 weeks

## 2021-01-19 ENCOUNTER — TELEPHONE (OUTPATIENT)
Dept: PHARMACY | Facility: CLINIC | Age: 46
End: 2021-01-19

## 2021-01-19 NOTE — TELEPHONE ENCOUNTER
"Called Lars for follow-up - he reports that he has been able to get all of his medications.   Dizziness has improved, states he feels \"normal\" and has no concerns at this time.     He did purchase a BP cuff - BP has been -140's systolic.     He states he is checking his blood sugars 120-170 in the morning,  after work/before bed and they are 180-190.     He has planned follow-up with Dr. Pantoja in Februrary.     No needs at this time - will follow-up in Feb as planned.    Janelle Cabezas, PharmD, URIEL, BCACP  MTM Pharmacist, [facility]    "

## 2021-01-26 ENCOUNTER — PATIENT OUTREACH (OUTPATIENT)
Dept: CARE COORDINATION | Facility: CLINIC | Age: 46
End: 2021-01-26

## 2021-01-26 NOTE — PROGRESS NOTES
Clinic Care Coordination Contact  Program: [facility] (doesn't need ).  County: [place]      FRW Outreach:  1/26/2021: FRW checked the guarantor account notes and patient was approved for [facility] care at 100% from 12/15/20 to 7/26/21. FRW called patient and advised him of the approval and went over lj care a little more with patient. FRW will resolve the FRW episode and remove patient from panel as no further FRW needs at this time. Please refer to FRW for future needs.     Closed Encounter:  1/12/20201: FRW checked the guarantor account notes and lj care is requesting income verification, bank statements, and the federal 1040. FRW called patient and he states he just received the letter from [facility] and he's planning on emailing the documents within the next couple of days. FRW will check notes and make outreach in 2 weeks.   1/5/2021: FRW checked the guarantor account notes and did not see anything noted by [facility]. FRW called patient and he states he just sent the application in today The billing department called him and he let them know he just sent in a lj care application and they will put a hold on his bill for now. FRW will check on the status in 1 week and make outreach.   12/22/2020: FRW received a call from patient. We went through the [employer] screening and patient and wife's residency status does not allow them to apply through [employer] or get [place] or [employer]. I discussed lj care with patient and he is interested. I emailed an lj care application to [email or web address] and will follow up with patient in 2 weeks.   12/22/2020: Outreach attempted x 1. Left message on [employer] with call back information and requested return call.  Plan: FRW will call again within one week.       Health Insurance Screening: [place]   1. Do you currently have health insurance, did you receive a renewal? Himself and wife don't have insurance a couple of years ago  2. If you  applied through [employer], do you know your username/password? Yes, they didn't accept him.   3. How many people in the household? 5, himself, wife and 3 kids   4. Do you file taxes, who do you file with? Yes, files with wife and claims all 3 kids  5. What is your monthly income (include all tax members)? He works part time $15 30 hours, wife works $16 20-25 hours  6. Do you have access to insurance through an employer (if yes, need EIN)?   7. Do you have social security cards and/or green cards? No    Health Insurance:      Referral/Screening:    Program Interested In:     Insurance:   Is patient requesting help applying for insurance?    YES     Mn-its outcome (insert mn-its here):  Inactive     Screening Questions:   1. Have you recently applied for insurance or do for a renewal? If so, when? NO   2. How many people in your household?  5   3. Do you file taxes, who do you claim? Yes, wife and 3 children   5. What is the monthly gross income for the household (wages, social security, workers comp, and pension)? 3,000     Financial Resource Worker Follow Up    Goals:   Goals Addressed as of 1/26/2021 at 10:40 AM                 Today    1/12/21      Financial Wellbeing- Lj care (pt-stated)   100%  70%    Added 12/21/20 by Viviana Lr, BSW     Goal Statement: I would like help paying for medical bills while I don't have insurance over the next three months.   Date Goal set: 12/21/2020  Barriers: unsure where to get help   Strengths: asking for help   Date to Achieve By: 3/21/2020  Patient expressed understanding of goal: yes  Action steps to achieve this goal:  1. I will complete a lj care application   2. I will talk CC team about other resources that will help me.                 Intervention and Education during outreach: n/a    FRW Plan: n/a

## 2021-02-03 ENCOUNTER — PATIENT OUTREACH (OUTPATIENT)
Dept: CARE COORDINATION | Facility: CLINIC | Age: 46
End: 2021-02-03

## 2021-02-03 NOTE — PROGRESS NOTES
Clinic Care Coordination Contact  [facility]/Voicemail    Clinical Data: Care Coordinator Outreach  Outreach attempted x 1.  Left message on patient's voicemail with call back information and requested return call.  Plan: Care Coordinator will try to reach patient again in 10 business days.    Notes:  Prescription assistance program?  Resources for sliding scale fee community clinics?

## 2021-02-10 ENCOUNTER — PATIENT OUTREACH (OUTPATIENT)
Dept: CARE COORDINATION | Facility: CLINIC | Age: 46
End: 2021-02-10

## 2021-02-10 NOTE — PROGRESS NOTES
Clinic Care Coordination Contact  [facility]/Voicemail       Clinical Data: Care Coordinator Outreach  Outreach attempted x 1.  Left message on patient's voicemail with call back information and requested return call.  Plan:  will attempt to reach patient again in 30 days.

## 2021-02-23 ENCOUNTER — PATIENT OUTREACH (OUTPATIENT)
Dept: CARE COORDINATION | Facility: CLINIC | Age: 46
End: 2021-02-23

## 2021-02-23 NOTE — PROGRESS NOTES
Clinic Care Coordination Contact  [facility]/Voicemail       Clinical Data: Care Coordinator Outreach  Outreach attempted x 2.  Left message on patient's voicemail with call back information and requested return call.  Plan: Care Coordinator will send unable to contact letter with care coordinator contact information via mail. Care Coordinator will do no further outreaches at this time.    Sending an email to pt to try and connect.

## 2021-02-23 NOTE — LETTER
[facility]  [address]  [place] [facility] [address]  February 23, 2021    Lars Daley  [address] [facility] [address]      Dear Lars,    I have been attempting to reach you since our last contact. I would like to continue to work with you and provide any additional support you may need on achieving your health care related goals. I would appreciate if you would give me a call at [phone] to let me know if you would like to continue working together. I know that there are many things that can affect our ability to communicate and I hope we can continue to work together.    All of us at the [facility] are invested in your health and are here to assist you in meeting your goals.     Sincerely,    Lexii Soriano  Community Health Worker

## 2021-03-09 NOTE — PROGRESS NOTES
Assessment & Plan       ICD-10-CM    1. Type 2 diabetes mellitus without complication, without long-term current use of insulin (H)  E11.9 Hemoglobin A1c     Lipid panel reflex to direct LDL Fasting     Basic metabolic panel  (Ca, Cl, CO2, Creat, Gluc, K, Na, BUN)     lisinopril (ZESTRIL) 10 MG tablet     metFORMIN (GLUCOPHAGE-XR) 500 MG 24 hr tablet   2. Benign essential hypertension  I10    3. Microalbuminuria  R80.9 lisinopril (ZESTRIL) 10 MG tablet   4. Hyperlipidemia LDL goal <100  E78.5 Lipid panel reflex to direct LDL Fasting     atorvastatin (LIPITOR) 20 MG tablet   5. Type 2 diabetes mellitus with stage 2 chronic kidney disease, without long-term current use of insulin (H)  E11.22     N18.2       DM II- DM control much improved compared to last visit.  He's now back on all his medications, taking them regularly.  Also improved diet again (stopped soda for months, less tortillas for last couple yrs).  A1C down from 10's to 7.4.  Tolerating metformin XR 2g/d and glipizide 5mg/d.  BP improved but still borderline.  Lipids pending.  Will cont current DM meds, and rec f/u in 3 months with A1C recheck.  Bring in glucose monitor to next appt.    HTN/CKD-  BP in borderline control with the lisinopril at 5mg/d.  Rec trial of increasing it to 10mg/d.  Risks and benefits of medication(s) including potential side effects reviewed with patient.  Questions answered.   Okay for f/u in 3 months.    Lipids-   Fasting today, and back on lipitor 20mg/d, should see improvement in lipid panel checked today.    Health Maintenance- multiple  issues to address at upcoming visit.   Did not get to them today, including, unfortunately, Tdap needed.  Will try to get to more of these at next visit.    Return in about 3 months (around 6/10/2021) for Blood pressure recheck, DM check (A1C/BMP labs, okay to eat before appointment).     BMI:   Estimated body mass index is 27.88 kg/m  as calculated from the following:    Height as of  "this encounter: 1.638 m (5' 4.5\").    Weight as of this encounter: 74.8 kg (165 lb).   Weight management plan: Discussed healthy diet and exercise guidelines      Mago Pantoja MD  [facility]              Kori Sousa is a 46 year old who presents for the following health issues - diabetes, HTN, high lipids    HPI     Diabetes Follow-up    How often are you checking your blood sugar? One time daily  What time of day are you checking your blood sugars (select all that apply)?  Before and after meals  Have you had any blood sugars above 200?  Yes - just two times  Have you had any blood sugars below 70?  Yes - no, to 86 the low    What symptoms do you notice when your blood sugar is low?  Dizzy    What concerns do you have today about your diabetes? Blood Pressure concerns      Do you have any of these symptoms? (Select all that apply)  No numbness or tingling in feet.  No redness, sores or blisters on feet.  No complaints of excessive thirst.  No reports of blurry vision.  No significant changes to weight.    Have you had a diabetic eye exam in the last 12 months? No    Taking meds, every day.  Lowest glucose ~86.  Felt tired, lightheaded.  Felt better 2-3 hrs after, checked his glucose, and ate right away.  Only time it happened.  Unsure why, no known exercise/diet difference.    Glucose readings  Before bed - 140-150  Sometimes checks at night-   106 2 wks ago - before bed.  Sometimes checks in am, unsure numbers, but they are usually better.    Diet/exercise-   Stopped drinking soda- ~4 months.  Eating less bread, tortillas (2-3/day down from 10/day).  Eating more beans, chicken, rice.  Less carbs for the past 2-3 yrs.  Weight is down with that change- from ~175 lbs to ~165 lbs.    Exercise- not now, but will be walking a lot with his job soon.    One Sunday, he felt dizzy/nausea.  Felt horrible, new symptoms for him.  Lasted ~5 hrs.  Then improved, fine by the next day.    Two " "days later, got a home BP cuff.  Checked his BP- 170.  Checked BP 2 later-   Started 148 max, lowest 134-135.  Most in the 'yellow' range, or first red range.          BP Readings from Last 2 Encounters:   03/10/21 137/87   12/15/20 (!) 150/92     Hemoglobin A1C (%)   Date Value   03/10/2021 7.4 (H)   12/15/2020 10.4 (H)     LDL Cholesterol Calculated (mg/dL)   Date Value   03/10/2021 79   12/15/2020 116 (H)         How many servings of fruits and vegetables do you eat daily?  2-3    On average, how many sweetened beverages do you drink each day (Examples: soda, juice, sweet tea, etc.  Do NOT count diet or artificially sweetened beverages)?   0    How many days per week do you exercise enough to make your heart beat faster? 3 or less    How many minutes a day do you exercise enough to make your heart beat faster? 9 or less    How many days per week do you miss taking your medication? 0        Review of Systems   Constitutional, HEENT, cardiovascular, pulmonary, gi and gu systems are negative, except as otherwise noted.        Objective    /87 (BP Location: Right arm, Patient Position: Sitting, Cuff Size: Adult Large)   Pulse 74   Resp 14   Ht 1.638 m (5' 4.5\")   Wt 74.8 kg (165 lb)   SpO2 100%   BMI 27.88 kg/m    Body mass index is 27.88 kg/m .  Physical Exam   GENERAL APPEARANCE: healthy, alert and no distress     EYES: PERRL, sclera clear     HENT: nose and mouth without ulcers or lesions     NECK: no adenopathy, no asymmetry, masses, or scars and thyroid normal to palpation     RESP: lungs clear to auscultation - no rales, rhonchi or wheezes     CV: regular rates and rhythm, normal S1 S2, no S3 or S4 and no murmur, click or rub      Abdomen: soft, nontender, no HSM or masses and bowel sounds normal     Ext: warm, dry, no edema      Psych: full range affect, normal speech and grooming, judgement and insight intact     Office Visit on 12/15/2020   Component Date Value Ref Range Status     Cholesterol " 12/15/2020 224* <200 mg/dL Final    Desirable:       <200 mg/dl     Triglycerides 12/15/2020 384* <150 mg/dL Final    Comment: Borderline high:  150-199 mg/dl  High:             200-499 mg/dl  Very high:       >499 mg/dl  Fasting specimen       HDL Cholesterol 12/15/2020 31* >39 mg/dL Final     LDL Cholesterol Calculated 12/15/2020 116* <100 mg/dL Final    Comment: Above desirable:  100-129 mg/dl  Borderline High:  130-159 mg/dL  High:             160-189 mg/dL  Very high:       >189 mg/dl       Non HDL Cholesterol 12/15/2020 193* <130 mg/dL Final    Comment: Above Desirable:  130-159 mg/dl  Borderline high:  160-189 mg/dl  High:             190-219 mg/dl  Very high:       >219 mg/dl       Sodium 12/15/2020 134  133 - 144 mmol/L Final     Potassium 12/15/2020 4.0  3.4 - 5.3 mmol/L Final     Chloride 12/15/2020 105  94 - 109 mmol/L Final     Carbon Dioxide 12/15/2020 23  20 - 32 mmol/L Final     Anion Gap 12/15/2020 6  3 - 14 mmol/L Final     Glucose 12/15/2020 261* 70 - 99 mg/dL Final    Fasting specimen     Urea Nitrogen 12/15/2020 17  7 - 30 mg/dL Final     Creatinine 12/15/2020 0.64* 0.66 - 1.25 mg/dL Final     GFR Estimate 12/15/2020 >90  >60 mL/min/[1.73_m2] Final    Comment: Non  GFR Calc  Starting 12/18/2018, serum creatinine based estimated GFR (eGFR) will be   calculated using the Chronic Kidney Disease Epidemiology Collaboration   (CKD-EPI) equation.       GFR Estimate If Black 12/15/2020 >90  >60 mL/min/[1.73_m2] Final    Comment:  GFR Calc  Starting 12/18/2018, serum creatinine based estimated GFR (eGFR) will be   calculated using the Chronic Kidney Disease Epidemiology Collaboration   (CKD-EPI) equation.       Calcium 12/15/2020 8.6  8.5 - 10.1 mg/dL Final     Hemoglobin A1C 12/15/2020 10.4* 0 - 5.6 % Final    Comment: Normal <5.7% Prediabetes 5.7-6.4%  Diabetes 6.5% or higher - adopted from ADA   consensus guidelines.       TSH 12/15/2020 1.97  0.40 - 4.00 mU/L Final      Creatinine Urine 12/15/2020 70  mg/dL Final     Albumin Urine mg/L 12/15/2020 24  mg/L Final     Albumin Urine mg/g Cr 12/15/2020 33.96* 0 - 17 mg/g Cr Final     Results for orders placed or performed in visit on 03/10/21   Hemoglobin A1c     Status: Abnormal   Result Value Ref Range    Hemoglobin A1C 7.4 (H) 0 - 5.6 %   Lipid panel reflex to direct LDL Fasting     Status: Abnormal   Result Value Ref Range    Cholesterol 134 <200 mg/dL    Triglycerides 89 <150 mg/dL    HDL Cholesterol 37 (L) >39 mg/dL    LDL Cholesterol Calculated 79 <100 mg/dL    Non HDL Cholesterol 97 <130 mg/dL   Basic metabolic panel  (Ca, Cl, CO2, Creat, Gluc, K, Na, BUN)     Status: Abnormal   Result Value Ref Range    Sodium 137 133 - 144 mmol/L    Potassium 4.2 3.4 - 5.3 mmol/L    Chloride 107 94 - 109 mmol/L    Carbon Dioxide 26 20 - 32 mmol/L    Anion Gap 4 3 - 14 mmol/L    Glucose 107 (H) 70 - 99 mg/dL    Urea Nitrogen 13 7 - 30 mg/dL    Creatinine 0.81 0.66 - 1.25 mg/dL    GFR Estimate >90 >60 mL/min/[1.73_m2]    GFR Estimate If Black >90 >60 mL/min/[1.73_m2]    Calcium 8.5 8.5 - 10.1 mg/dL

## 2021-03-10 ENCOUNTER — OFFICE VISIT (OUTPATIENT)
Dept: FAMILY MEDICINE | Facility: CLINIC | Age: 46
End: 2021-03-10

## 2021-03-10 VITALS
SYSTOLIC BLOOD PRESSURE: 137 MMHG | DIASTOLIC BLOOD PRESSURE: 87 MMHG | RESPIRATION RATE: 14 BRPM | BODY MASS INDEX: 27.49 KG/M2 | WEIGHT: 165 LBS | HEIGHT: 65 IN | HEART RATE: 74 BPM | OXYGEN SATURATION: 100 %

## 2021-03-10 DIAGNOSIS — E11.22 TYPE 2 DIABETES MELLITUS WITH STAGE 2 CHRONIC KIDNEY DISEASE, WITHOUT LONG-TERM CURRENT USE OF INSULIN (H): ICD-10-CM

## 2021-03-10 DIAGNOSIS — R80.9 MICROALBUMINURIA: ICD-10-CM

## 2021-03-10 DIAGNOSIS — N18.2 TYPE 2 DIABETES MELLITUS WITH STAGE 2 CHRONIC KIDNEY DISEASE, WITHOUT LONG-TERM CURRENT USE OF INSULIN (H): ICD-10-CM

## 2021-03-10 DIAGNOSIS — E11.9 TYPE 2 DIABETES MELLITUS WITHOUT COMPLICATION, WITHOUT LONG-TERM CURRENT USE OF INSULIN (H): Primary | ICD-10-CM

## 2021-03-10 DIAGNOSIS — I10 BENIGN ESSENTIAL HYPERTENSION: ICD-10-CM

## 2021-03-10 DIAGNOSIS — E78.5 HYPERLIPIDEMIA LDL GOAL <100: ICD-10-CM

## 2021-03-10 LAB — HBA1C MFR BLD: 7.4 % (ref 0–5.6)

## 2021-03-10 PROCEDURE — 36415 COLL VENOUS BLD VENIPUNCTURE: CPT | Performed by: FAMILY MEDICINE

## 2021-03-10 PROCEDURE — 80061 LIPID PANEL: CPT | Performed by: FAMILY MEDICINE

## 2021-03-10 PROCEDURE — 80048 BASIC METABOLIC PNL TOTAL CA: CPT | Performed by: FAMILY MEDICINE

## 2021-03-10 PROCEDURE — 83036 HEMOGLOBIN GLYCOSYLATED A1C: CPT | Performed by: FAMILY MEDICINE

## 2021-03-10 PROCEDURE — 99214 OFFICE O/P EST MOD 30 MIN: CPT | Performed by: FAMILY MEDICINE

## 2021-03-10 RX ORDER — ATORVASTATIN CALCIUM 20 MG/1
10 TABLET, FILM COATED ORAL DAILY
Qty: 45 TABLET | Refills: 3 | Status: SHIPPED | OUTPATIENT
Start: 2021-03-10 | End: 2022-03-01

## 2021-03-10 RX ORDER — LISINOPRIL 10 MG/1
10 TABLET ORAL DAILY
Qty: 90 TABLET | Refills: 0 | Status: SHIPPED | OUTPATIENT
Start: 2021-03-10 | End: 2021-09-29

## 2021-03-10 RX ORDER — METFORMIN HCL 500 MG
1000 TABLET, EXTENDED RELEASE 24 HR ORAL 2 TIMES DAILY
Qty: 360 TABLET | Refills: 0 | Status: SHIPPED | OUTPATIENT
Start: 2021-03-10 | End: 2021-09-29

## 2021-03-10 ASSESSMENT — PAIN SCALES - GENERAL: PAINLEVEL: NO PAIN (0)

## 2021-03-10 ASSESSMENT — MIFFLIN-ST. JEOR: SCORE: 1542.38

## 2021-03-10 NOTE — LETTER
March 15, 2021      Lars Daley  [address] [facility] [address]        Dear ,    Here are your lab results from your recent visit...   --Your basic metabolic panel (which includes electrolyte levels, blood sugar level and kidney function tests) looks normal.   --Your hemoglobin A1C (the three month average of your glucose levels) looks much, much improved as we discussed.   --Your cholesterol panel also looks much improved with a low LDL (the bad cholesterol), though your HDL (the good cholesterol) is still lower than we'd like to see.       Please let me know if you have any questions.   Best,   Erick Pantoja MD       Resulted Orders   Hemoglobin A1c   Result Value Ref Range    Hemoglobin A1C 7.4 (H) 0 - 5.6 %      Comment:      Normal <5.7% Prediabetes 5.7-6.4%  Diabetes 6.5% or higher - adopted from ADA   consensus guidelines.     Lipid panel reflex to direct LDL Fasting   Result Value Ref Range    Cholesterol 134 <200 mg/dL    Triglycerides 89 <150 mg/dL      Comment:      Fasting specimen    HDL Cholesterol 37 (L) >39 mg/dL    LDL Cholesterol Calculated 79 <100 mg/dL      Comment:      Desirable:       <100 mg/dl    Non HDL Cholesterol 97 <130 mg/dL   Basic metabolic panel  (Ca, Cl, CO2, Creat, Gluc, K, Na, BUN)   Result Value Ref Range    Sodium 137 133 - 144 mmol/L    Potassium 4.2 3.4 - 5.3 mmol/L    Chloride 107 94 - 109 mmol/L    Carbon Dioxide 26 20 - 32 mmol/L    Anion Gap 4 3 - 14 mmol/L    Glucose 107 (H) 70 - 99 mg/dL      Comment:      Fasting specimen    Urea Nitrogen 13 7 - 30 mg/dL    Creatinine 0.81 0.66 - 1.25 mg/dL    GFR Estimate >90 >60 mL/min/[1.73_m2]      Comment:      Non  GFR Calc  Starting 12/18/2018, serum creatinine based estimated GFR (eGFR) will be   calculated using the Chronic Kidney Disease Epidemiology Collaboration   (CKD-EPI) equation.      GFR Estimate If Black >90 >60 mL/min/[1.73_m2]      Comment:       GFR Calc  Starting  12/18/2018, serum creatinine based estimated GFR (eGFR) will be   calculated using the Chronic Kidney Disease Epidemiology Collaboration   (CKD-EPI) equation.      Calcium 8.5 8.5 - 10.1 mg/dL

## 2021-03-11 LAB
ANION GAP SERPL CALCULATED.3IONS-SCNC: 4 MMOL/L (ref 3–14)
BUN SERPL-MCNC: 13 MG/DL (ref 7–30)
CALCIUM SERPL-MCNC: 8.5 MG/DL (ref 8.5–10.1)
CHLORIDE SERPL-SCNC: 107 MMOL/L (ref 94–109)
CHOLEST SERPL-MCNC: 134 MG/DL
CO2 SERPL-SCNC: 26 MMOL/L (ref 20–32)
CREAT SERPL-MCNC: 0.81 MG/DL (ref 0.66–1.25)
GFR SERPL CREATININE-BSD FRML MDRD: >90 ML/MIN/{1.73_M2}
GLUCOSE SERPL-MCNC: 107 MG/DL (ref 70–99)
HDLC SERPL-MCNC: 37 MG/DL
LDLC SERPL CALC-MCNC: 79 MG/DL
NONHDLC SERPL-MCNC: 97 MG/DL
POTASSIUM SERPL-SCNC: 4.2 MMOL/L (ref 3.4–5.3)
SODIUM SERPL-SCNC: 137 MMOL/L (ref 133–144)
TRIGL SERPL-MCNC: 89 MG/DL

## 2021-03-15 NOTE — RESULT ENCOUNTER NOTE
Please send letter below with copy of results.  Thanks! CW    Here are your lab results from your recent visit...  --Your basic metabolic panel (which includes electrolyte levels, blood sugar level and kidney function tests) looks normal.  --Your hemoglobin A1C (the three month average of your glucose levels) looks much, much improved as we discussed.  --Your cholesterol panel also looks much improved with a low LDL (the bad cholesterol), though your HDL (the good cholesterol) is still lower than we'd like to see.      Please let me know if you have any questions.  Best,   Erick Pantoja MD

## 2021-03-30 ENCOUNTER — PATIENT OUTREACH (OUTPATIENT)
Dept: CARE COORDINATION | Facility: CLINIC | Age: 46
End: 2021-03-30

## 2021-03-30 NOTE — PROGRESS NOTES
Clinic Care Coordination Contact    Follow Up Progress Note      Assessment: CCSW called and spoke with pt who shares that he is doing very well. Prescription goal reviwed and patient shares that he no longer has any issues getting or affording any of his scripts at this time.    CCSW asks if patient would like to stay in CC as it's been very hard to connect with pt in the past as he is very busy with work. Pt shares that he is off on Monday's and this would be the best day to reach him. CCSW and pt decide they do next check in in a few months to assess for any needs    Pt thanks SW for the calling and checking in.    Goals addressed this encounter:   Goals Addressed                 This Visit's Progress       Patient Stated      Medical- prescription assistance (pt-stated)        Goal Statement: I would like help affording medications in the next three months.   Date Goal set: 12/21/20  Barriers: financial  Strengths: CC support, open to help  Date to Achieve By: 3/21/21  Patient expressed understanding of goal: yes    Action steps to achieve this goal:  1. I will review the information the CC SW sent me about the prescription assistance programs.(Complete)  2. I will apply for the prescription assistance programs and let the CHW know if I need more assistance.  3. I will give the CHW updates at outreach calls.    Updated: 2/3/21  Updated: 3/30/2021 DIXIE               Plan:   DEVON moved patient to maintenance.    will complete next outreach in 45 days.

## 2021-04-05 ENCOUNTER — IMMUNIZATION (OUTPATIENT)
Dept: PEDIATRICS | Facility: CLINIC | Age: 46
End: 2021-04-05

## 2021-04-05 PROCEDURE — 0001A PR COVID VAC PFIZER DIL RECON 30 MCG/0.3 ML IM: CPT

## 2021-04-05 PROCEDURE — 91300 PR COVID VAC PFIZER DIL RECON 30 MCG/0.3 ML IM: CPT

## 2021-04-26 ENCOUNTER — IMMUNIZATION (OUTPATIENT)
Dept: PEDIATRICS | Facility: CLINIC | Age: 46
End: 2021-04-26
Attending: INTERNAL MEDICINE

## 2021-04-26 PROCEDURE — 0002A PR COVID VAC PFIZER DIL RECON 30 MCG/0.3 ML IM: CPT

## 2021-04-26 PROCEDURE — 91300 PR COVID VAC PFIZER DIL RECON 30 MCG/0.3 ML IM: CPT

## 2021-06-17 ENCOUNTER — PATIENT OUTREACH (OUTPATIENT)
Dept: CARE COORDINATION | Facility: CLINIC | Age: 46
End: 2021-06-17

## 2021-06-17 NOTE — PROGRESS NOTES
Clinic Care Coordination Contact  [facility]/Voicemail       Clinical Data: Care Coordinator Outreach  Outreach attempted x 1.  Left message on patient's voicemail with call back information and requested return call.  Plan: SW will attempt to reach pt again in 7-10 days.

## 2021-06-29 ENCOUNTER — PATIENT OUTREACH (OUTPATIENT)
Dept: CARE COORDINATION | Facility: CLINIC | Age: 46
End: 2021-06-29

## 2021-06-29 NOTE — PROGRESS NOTES
Clinic Care Coordination Contact    Follow Up Progress Note      Assessment: DEVON called patient who shares that he is doing very well and Thanked DEVON for checking in.    Pt will have some upcoming appt's and would like to know how he can go about getting help with lj care if he gets a bill. SW let's him know he can apply online or he can call me/CC and we can assist with that.    No other concerns/needs today.    Goals addressed this encounter:   Goals Addressed                 This Visit's Progress       Patient Stated      Medical- prescription assistance (pt-stated)   On track     Goal Statement: I would like help affording medications in the next three months.   Date Goal set: 12/21/20  Barriers: financial  Strengths: CC support, open to help  Date to Achieve By: 3/21/21  Patient expressed understanding of goal: yes    Action steps to achieve this goal:  1. I will review the information the CC DEVON sent me about the prescription assistance programs.(Complete)  2. I will apply for the prescription assistance programs and let the CHW know if I need more assistance.  3. I will give the CHW updates at outreach calls.    Updated: 2/3/21  Updated: 3/30/2021 DIXIE             Intervention/Education provided during outreach:  Discussed overall well-being and possibly needing lj care     Outreach Frequency: 2 months    Plan:    will call and complete outreach in another two months.

## 2021-07-14 ENCOUNTER — HOSPITAL ENCOUNTER (EMERGENCY)
Facility: CLINIC | Age: 46
Discharge: HOME | End: 2021-07-14
Attending: PHYSICIAN ASSISTANT | Admitting: PHYSICIAN ASSISTANT

## 2021-07-14 ENCOUNTER — APPOINTMENT (OUTPATIENT)
Dept: GENERAL RADIOLOGY | Facility: CLINIC | Age: 46
End: 2021-07-14
Attending: EMERGENCY MEDICINE

## 2021-07-14 ENCOUNTER — NURSE TRIAGE (OUTPATIENT)
Dept: FAMILY MEDICINE | Facility: CLINIC | Age: 46
End: 2021-07-14

## 2021-07-14 VITALS
OXYGEN SATURATION: 98 % | TEMPERATURE: 97.6 F | SYSTOLIC BLOOD PRESSURE: 137 MMHG | BODY MASS INDEX: 26.52 KG/M2 | HEIGHT: 66 IN | HEART RATE: 78 BPM | WEIGHT: 165 LBS | RESPIRATION RATE: 12 BRPM | DIASTOLIC BLOOD PRESSURE: 81 MMHG

## 2021-07-14 DIAGNOSIS — S49.91XA SHOULDER INJURY, RIGHT, INITIAL ENCOUNTER: ICD-10-CM

## 2021-07-14 PROCEDURE — 99283 EMERGENCY DEPT VISIT LOW MDM: CPT

## 2021-07-14 PROCEDURE — 73030 X-RAY EXAM OF SHOULDER: CPT | Mod: RT

## 2021-07-14 ASSESSMENT — MIFFLIN-ST. JEOR: SCORE: 1566.19

## 2021-07-14 NOTE — TELEPHONE ENCOUNTER
"Patient fell approximately 7 pm. last evening while at work landing on floor injuring his right shoulder.  He states he is unable to move right arm without causing severe pain and he hears a noise or popping if he does try to move it.  He is unable to say if there is any obvious deformity.  He is staying still and if still he rates the pain 5 out of 10.  He is able to move the fingers but feels numbness and tingling and is concerned about the circulation to his right upper extremity.  Denies discoloration or temperature change to extremity.  Advised patient to be seen in ER for evaluation.  BRYANT An R.N.    Reason for Disposition    Shoulder injury is main concern    Can't move injured shoulder at all    Collar bone is painful or tender to touch    Additional Information    Negative: Major bleeding (actively dripping or spurting) that can't be stopped    Negative: Serious injury with multiple fractures (broken bones)    Negative: Sounds like a life-threatening emergency to the triager    Negative: Major bleeding (actively dripping or spurting) that can't be stopped    Negative: Amputation or bone sticking through the skin    Negative: Bullet, stabbed by knife or other serious penetrating wound    Negative: Serious injury with multiple fractures (broken bones)    Negative: Sounds like a life-threatening emergency to the triager    Negative: Wound looks infected    Negative: Looks like a broken bone or dislocated joint (crooked or deformed)    Negative: Skin is split open or gaping (length > 1/2 inch or 12 mm)    Negative: Bleeding won't stop after 10 minutes of direct pressure (using correct technique)    Negative: Dirt in the wound and not removed after 15 minutes of scrubbing    Negative: Sounds like a serious injury to the triager    Answer Assessment - Initial Assessment Questions  1. MECHANISM: \"How did the injury happen?\"      Walking at work when he fell striking right shoulder on floor  2. ONSET: \"When did " "the injury happen?\" (Minutes or hours ago)       Approximately 12 hours ago  3. LOCATION: \"Where is the injury located?\"       Right shoulder/arm  4. APPEARANCE of INJURY: \"What does the injury look like?\"       No obvious deformity or change in color per patient  5. SEVERITY: \"Can you use the arm normally?\"       Unable to use arm at present time, he has concerns about a dislocation or fracture  6. SWELLING or BRUISING: \"is there any swelling or bruising?\" If so, ask: \"How large is it? (e.g., inches, centimeters)       None seen  7. PAIN: \"Is there pain?\" If so, ask: \"How bad is the pain?\"    (Scale 1-10; or mild, moderate, severe)      Pain is 5 out of 10 if he holds still  8. TETANUS: For any breaks in the skin, ask: \"When was the last tetanus booster?\"      No break in skin  9. OTHER SYMPTOMS: \"Do you have any other symptoms?\"  (e.g., numbness in hand)      Numbness and tingling in right hand  10. PREGNANCY: \"Is there any chance you are pregnant?\" \"When was your last menstrual period?\"        NA    Protocols used: ARM INJURY-A-OH, SHOULDER INJURY-A-OH      "

## 2021-07-14 NOTE — ED PROVIDER NOTES
"  History     Chief Complaint:  Shoulder Injury    HPI   Lars Daley is a 46 year old male who presents with shoulder injury.  The patient sustained a right shoulder injury at work 1 day ago. The patient reports that he picked up a tool and his arm twisted causing right shoulder pain, which lead to his primary reason for coming into the emergency department. The patient has swelling in his right shoulder and right arm, and cannot close his right hand without feeling discomfort.    Review of Systems   Musculoskeletal:        Shoulder pain   Arm swelling   All other systems reviewed and are negative.      Allergies:  No Known Drug Allergies    Medications:    acyclovir   aspirin  atorvastatin  blood glucose test strip  blood glucose monitoring test strip  glipizide  microlet lancets   lisinopril   metformin  Liquifilm tears    Past Medical History:    Depression, major, single episode, moderate  Hyperlipidemia  Diabetes, type II  Hypertension  Fatty liver  Bell's palsy  Microalbuminuria  Plantar warts  Tension headache  Chronic kidney disease     Past Surgical History:    Stress echo (metro)    Family History:    Maternal uncle: Heart Disease, CAD  Mother: Diabetes, CAD    Social History:  This is a work related injury.  The patient works at [employer].     Physical Exam     Patient Vitals for the past 24 hrs:   BP Temp Temp src Pulse Resp SpO2 Height Weight   07/14/21 1210 137/81 97.6  F (36.4  C) Temporal 78 12 98 % 1.676 m (5' 6\") 74.8 kg (165 lb)     Physical Exam  Constitutional: alert, cooperative   CV:  2+ radial pulse, brisk distal cap refill  Pulm: No respiratory distress  MSK: diffuse tenderness to the superior right shoulder, no bony tenderness. No midline cervical tenderness. Able to passively range flex shoulder 90 degrees, unable to actively range shoulder secondary to pain.    Neurological: Alert, attentive  5/5  strength;  sensation intact RUE.   Skin: Skin is warm and dry.   Psych: Normal mood and " affect     Emergency Department Course     Imaging:    XR Shoulder Right G/E 3 Views  Negative exam.  HENRIQUE EMERSON MD   Reading per radiology    Emergency Department Course:    Reviewed:  I reviewed nursing notes, vitals, past history and care everywhere    Assessments:  1449 I obtained history and examined the patient as noted above.     Disposition:  The patient was discharged to home.    Impression & Plan      Medical Decision Making:  Lars Daley is a 46 year old male presents emergency with right shoulder pain after an injury at work.  X-ray negative for fracture. CMS intact. Suspect soft tissue injury.  Sling provided as needed for comfort and plan for close follow-up with orthopedics should pain continue as MRI may be indicated.  Recommended ibuprofen, rest, ice, and close follow-up.  Return precautions discussed including distal numbness or weakness.    Diagnosis:    ICD-10-CM    1. Shoulder injury, right, initial encounter  S49.91XA      Discharge Medications:  Discharge Medication List as of 7/14/2021  3:09 PM        Scribe Disclosure:  I, JEAN JOSE, am serving as a scribe at 2:36 PM on 7/14/2021 to document services personally performed by Yadi Plaza PA-C based on my observations and the provider's statements to me.     I, Orla Severson, am serving as a scribe at 3:43 PM on 7/14/2021 to document services personally performed by Yadi Plaza PA-C based on my observations and the provider's statements to me.          Yadi Plaza PA-C  07/14/21 2055

## 2021-07-14 NOTE — DISCHARGE INSTRUCTIONS
Take ibuprofen 600 mg 3x per day with food. This will provide both pain control and fight against inflammation.   Follow up closely with orthopedics if pain continues, may need MRI

## 2021-07-14 NOTE — ED TRIAGE NOTES
Twisted shoulder yesterday and felt a pop.  Think might've dislocated it.  XR ordered.  CMS intact.

## 2021-07-16 ENCOUNTER — PATIENT OUTREACH (OUTPATIENT)
Dept: CARE COORDINATION | Facility: CLINIC | Age: 46
End: 2021-07-16

## 2021-07-17 NOTE — PROGRESS NOTES
Clinic Care Coordination Contact  [facility]/Voicemail       Clinical Data: Care Coordinator Outreach-ED follow up  Outreach attempted x 1.  Left message on patient's voicemail with call back information and requested return call.  Plan: CC SW will reach out to patient in 1-2 weeks.

## 2021-08-17 NOTE — PROGRESS NOTES
Clinic Care Coordination Contact  [facility]/Voicemail       Clinical Data: Care Coordinator Outreach  Outreach attempted x 2.  Left message on patient's voicemail with call back information and requested return call.  Plan: Care Coordinator will do no further outreaches at this time.

## 2021-09-29 DIAGNOSIS — R80.9 MICROALBUMINURIA: ICD-10-CM

## 2021-09-29 DIAGNOSIS — E11.9 TYPE 2 DIABETES MELLITUS WITHOUT COMPLICATION, WITHOUT LONG-TERM CURRENT USE OF INSULIN (H): ICD-10-CM

## 2021-09-29 RX ORDER — METFORMIN HCL 500 MG
1000 TABLET, EXTENDED RELEASE 24 HR ORAL 2 TIMES DAILY
Qty: 120 TABLET | Refills: 0 | Status: SHIPPED | OUTPATIENT
Start: 2021-09-29 | End: 2022-01-04

## 2021-09-29 RX ORDER — LISINOPRIL 10 MG/1
10 TABLET ORAL DAILY
Qty: 30 TABLET | Refills: 0 | Status: SHIPPED | OUTPATIENT
Start: 2021-09-29 | End: 2022-03-01

## 2021-09-29 RX ORDER — GLIPIZIDE 5 MG/1
5 TABLET, FILM COATED, EXTENDED RELEASE ORAL DAILY
Qty: 30 TABLET | Refills: 0 | Status: SHIPPED | OUTPATIENT
Start: 2021-09-29 | End: 2022-03-01

## 2021-09-29 NOTE — TELEPHONE ENCOUNTER
Prescription approved per [facility] Refill Protocol.  1 month refill only; patient due for appointment (followup)  Leanna BOOTHE RN

## 2022-01-04 ENCOUNTER — TELEPHONE (OUTPATIENT)
Dept: FAMILY MEDICINE | Facility: CLINIC | Age: 47
End: 2022-01-04

## 2022-01-04 DIAGNOSIS — E11.9 TYPE 2 DIABETES MELLITUS WITHOUT COMPLICATION, WITHOUT LONG-TERM CURRENT USE OF INSULIN (H): ICD-10-CM

## 2022-01-04 RX ORDER — METFORMIN HCL 500 MG
1000 TABLET, EXTENDED RELEASE 24 HR ORAL 2 TIMES DAILY
Qty: 360 TABLET | Refills: 0 | Status: SHIPPED | OUTPATIENT
Start: 2022-01-04 | End: 2022-03-01

## 2022-01-04 NOTE — TELEPHONE ENCOUNTER
Pt calling for Metformin refill until appt  Looks like should be out of a couple other meds too   Pt states he only needs Metformin right now   Prescription approved per [facility] Refill Protocol.  Leanna BOOTHE RN

## 2022-01-24 ENCOUNTER — IMMUNIZATION (OUTPATIENT)
Dept: NURSING | Facility: CLINIC | Age: 47
End: 2022-01-24
Payer: OTHER GOVERNMENT

## 2022-01-24 PROCEDURE — 0054A COVID-19,PF,PFIZER (12+ YRS): CPT

## 2022-01-24 PROCEDURE — 91305 COVID-19,PF,PFIZER (12+ YRS): CPT

## 2022-03-01 ENCOUNTER — OFFICE VISIT (OUTPATIENT)
Dept: FAMILY MEDICINE | Facility: CLINIC | Age: 47
End: 2022-03-01

## 2022-03-01 VITALS
WEIGHT: 163.6 LBS | TEMPERATURE: 97.4 F | HEIGHT: 66 IN | SYSTOLIC BLOOD PRESSURE: 154 MMHG | BODY MASS INDEX: 26.29 KG/M2 | HEART RATE: 69 BPM | OXYGEN SATURATION: 100 % | DIASTOLIC BLOOD PRESSURE: 91 MMHG

## 2022-03-01 DIAGNOSIS — R80.9 MICROALBUMINURIA: ICD-10-CM

## 2022-03-01 DIAGNOSIS — E78.5 HYPERLIPIDEMIA LDL GOAL <100: ICD-10-CM

## 2022-03-01 DIAGNOSIS — Z12.11 SCREEN FOR COLON CANCER: ICD-10-CM

## 2022-03-01 DIAGNOSIS — K76.0 FATTY LIVER DISEASE, NONALCOHOLIC: ICD-10-CM

## 2022-03-01 DIAGNOSIS — E11.22 TYPE 2 DIABETES MELLITUS WITH STAGE 1 CHRONIC KIDNEY DISEASE, WITHOUT LONG-TERM CURRENT USE OF INSULIN (H): Primary | ICD-10-CM

## 2022-03-01 DIAGNOSIS — Z23 NEED FOR TDAP VACCINATION: ICD-10-CM

## 2022-03-01 DIAGNOSIS — N18.1 TYPE 2 DIABETES MELLITUS WITH STAGE 1 CHRONIC KIDNEY DISEASE, WITHOUT LONG-TERM CURRENT USE OF INSULIN (H): Primary | ICD-10-CM

## 2022-03-01 LAB
ALBUMIN SERPL-MCNC: 3.7 G/DL (ref 3.4–5)
ALP SERPL-CCNC: 79 U/L (ref 40–150)
ALT SERPL W P-5'-P-CCNC: 44 U/L (ref 0–70)
ANION GAP SERPL CALCULATED.3IONS-SCNC: 10 MMOL/L (ref 3–14)
AST SERPL W P-5'-P-CCNC: 17 U/L (ref 0–45)
BILIRUB SERPL-MCNC: 0.3 MG/DL (ref 0.2–1.3)
BUN SERPL-MCNC: 15 MG/DL (ref 7–30)
CALCIUM SERPL-MCNC: 8.9 MG/DL (ref 8.5–10.1)
CHLORIDE BLD-SCNC: 104 MMOL/L (ref 94–109)
CO2 SERPL-SCNC: 21 MMOL/L (ref 20–32)
CREAT SERPL-MCNC: 0.69 MG/DL (ref 0.66–1.25)
FERRITIN SERPL-MCNC: 402 NG/ML (ref 26–388)
GFR SERPL CREATININE-BSD FRML MDRD: >90 ML/MIN/1.73M2
GLUCOSE BLD-MCNC: 264 MG/DL (ref 70–99)
HAV IGG SER QL IA: REACTIVE
HBA1C MFR BLD: 10.2 % (ref 0–5.6)
HBV CORE AB SERPL QL IA: NONREACTIVE
HBV SURFACE AB SERPL IA-ACNC: 0.81 M[IU]/ML
HBV SURFACE AG SERPL QL IA: NONREACTIVE
HCV AB SERPL QL IA: NONREACTIVE
HIV 1+2 AB+HIV1 P24 AG SERPL QL IA: NONREACTIVE
IRON SATN MFR SERPL: 27 % (ref 15–46)
IRON SERPL-MCNC: 91 UG/DL (ref 35–180)
POTASSIUM BLD-SCNC: 4 MMOL/L (ref 3.4–5.3)
PROT SERPL-MCNC: 7.6 G/DL (ref 6.8–8.8)
SODIUM SERPL-SCNC: 135 MMOL/L (ref 133–144)
TIBC SERPL-MCNC: 339 UG/DL (ref 240–430)

## 2022-03-01 PROCEDURE — 36415 COLL VENOUS BLD VENIPUNCTURE: CPT | Performed by: FAMILY MEDICINE

## 2022-03-01 PROCEDURE — 86803 HEPATITIS C AB TEST: CPT | Performed by: FAMILY MEDICINE

## 2022-03-01 PROCEDURE — 87340 HEPATITIS B SURFACE AG IA: CPT | Performed by: FAMILY MEDICINE

## 2022-03-01 PROCEDURE — 86706 HEP B SURFACE ANTIBODY: CPT | Performed by: FAMILY MEDICINE

## 2022-03-01 PROCEDURE — 86015 ACTIN ANTIBODY EACH: CPT | Mod: 90 | Performed by: FAMILY MEDICINE

## 2022-03-01 PROCEDURE — 86376 MICROSOMAL ANTIBODY EACH: CPT | Mod: 90 | Performed by: FAMILY MEDICINE

## 2022-03-01 PROCEDURE — 99215 OFFICE O/P EST HI 40 MIN: CPT | Mod: 25 | Performed by: FAMILY MEDICINE

## 2022-03-01 PROCEDURE — 83036 HEMOGLOBIN GLYCOSYLATED A1C: CPT | Performed by: FAMILY MEDICINE

## 2022-03-01 PROCEDURE — 90715 TDAP VACCINE 7 YRS/> IM: CPT | Performed by: FAMILY MEDICINE

## 2022-03-01 PROCEDURE — 86708 HEPATITIS A ANTIBODY: CPT | Performed by: FAMILY MEDICINE

## 2022-03-01 PROCEDURE — 90471 IMMUNIZATION ADMIN: CPT | Performed by: FAMILY MEDICINE

## 2022-03-01 PROCEDURE — 86704 HEP B CORE ANTIBODY TOTAL: CPT | Performed by: FAMILY MEDICINE

## 2022-03-01 PROCEDURE — 82728 ASSAY OF FERRITIN: CPT | Performed by: FAMILY MEDICINE

## 2022-03-01 PROCEDURE — 82784 ASSAY IGA/IGD/IGG/IGM EACH: CPT | Mod: 90 | Performed by: FAMILY MEDICINE

## 2022-03-01 PROCEDURE — 83550 IRON BINDING TEST: CPT | Performed by: FAMILY MEDICINE

## 2022-03-01 PROCEDURE — 87389 HIV-1 AG W/HIV-1&-2 AB AG IA: CPT | Performed by: FAMILY MEDICINE

## 2022-03-01 PROCEDURE — 80053 COMPREHEN METABOLIC PANEL: CPT | Performed by: FAMILY MEDICINE

## 2022-03-01 PROCEDURE — 86038 ANTINUCLEAR ANTIBODIES: CPT | Performed by: FAMILY MEDICINE

## 2022-03-01 PROCEDURE — 99000 SPECIMEN HANDLING OFFICE-LAB: CPT | Performed by: FAMILY MEDICINE

## 2022-03-01 RX ORDER — ATORVASTATIN CALCIUM 20 MG/1
10 TABLET, FILM COATED ORAL DAILY
Qty: 45 TABLET | Refills: 0 | Status: SHIPPED | OUTPATIENT
Start: 2022-03-01 | End: 2022-07-28

## 2022-03-01 RX ORDER — LISINOPRIL 10 MG/1
10 TABLET ORAL DAILY
Qty: 90 TABLET | Refills: 1 | Status: SHIPPED | OUTPATIENT
Start: 2022-03-01 | End: 2023-01-02

## 2022-03-01 RX ORDER — METFORMIN HCL 500 MG
1000 TABLET, EXTENDED RELEASE 24 HR ORAL 2 TIMES DAILY
Qty: 360 TABLET | Refills: 1 | Status: SHIPPED | OUTPATIENT
Start: 2022-03-01 | End: 2023-09-18

## 2022-03-01 RX ORDER — GLIPIZIDE 5 MG/1
5 TABLET, FILM COATED, EXTENDED RELEASE ORAL DAILY
Qty: 90 TABLET | Refills: 1 | Status: SHIPPED | OUTPATIENT
Start: 2022-03-01 | End: 2023-01-02

## 2022-03-01 NOTE — PROGRESS NOTES
Assessment & Plan       ICD-10-CM    1. Type 2 diabetes mellitus with stage 1 chronic kidney disease, without long-term current use of insulin (H)  E11.22 OPTOMETRY REFERRAL    N18.1 HEMOGLOBIN A1C     lisinopril (ZESTRIL) 10 MG tablet     glipiZIDE (GLUCOTROL XL) 5 MG 24 hr tablet     metFORMIN (GLUCOPHAGE-XR) 500 MG 24 hr tablet     HEMOGLOBIN A1C   2. Microalbuminuria  R80.9 lisinopril (ZESTRIL) 10 MG tablet   3. Hyperlipidemia LDL goal <100  E78.5 atorvastatin (LIPITOR) 20 MG tablet   4. Fatty liver disease, nonalcoholic  K76.0 Hepatitis Antibody A IgG     Hepatitis B surface antigen     Hepatitis B Surface Antibody     Hepatitis B core antibody     Ferritin     Iron and iron binding capacity     IgD     Anti Nuclear Junie IgG by IFA with Reflex     Liver kidney microsomal antibody IgG     Comprehensive metabolic panel (BMP + Alb, Alk Phos, ALT, AST, Total. Bili, TP)     F Actin EIA with reflex     HIV Antigen Antibody Combo     Hepatitis C Screen Reflex to HCV RNA Quant and Genotype     Hepatitis Antibody A IgG     Hepatitis B surface antigen     Hepatitis B Surface Antibody     Hepatitis B core antibody     Ferritin     Iron and iron binding capacity     IgD     Anti Nuclear Junie IgG by IFA with Reflex     Liver kidney microsomal antibody IgG     Comprehensive metabolic panel (BMP + Alb, Alk Phos, ALT, AST, Total. Bili, TP)     F Actin EIA with reflex     CANCELED: Actin (Smooth Muscle) Antibody ([facility])   5. Screen for colon cancer  Z12.11    6. Need for Tdap vaccination  Z23 TDAP VACCINE (Adacel, Boostrix)  [[id number]]      DM II/HTN/Lipids-  Pt ran out of medications again - hasn't taken any except for the metformin XR 2g/d which he states he's been taking regularly (even though he should have run out in 10/21 (until refill in 1/22).  Does state that his daughter is also on metformin, and they sometimes borrow each others.  He should have had a full year of atorvastatin, but states he hasn't had it.  He  "denies se's when he was on all of his meds, no se's now.  He does not bring in med bottles again today despite reminders at most visits.  --A1C back today - high again at 10.2 (reviewed after appt, not with pt at appt)-- up from 7.4 in 3/21, so suspect he has not been as consistent as he states on the metformin.  And if he has been consistent, he'll likely need more txt than just going back on the glipizide to normalize levels.  --Rec going back on lisinopril 10mg/d, though hard to know if it's at a good level or not.  --Rec going back on atorvastatin 10mg/d.  --F/u in 3 months for DM- bring med rx bottles and BP cuff monitor.    Fatty Liver- seen on '19 Abd CT scan.  Will check labs as above as indicated when fatty liver is found, though suspect it is mostly due to his diabetes.  Last set of LFTs were done in '15, and were normal at that time.  Will also check screening HIV and Hep C labs.    Return in about 3 months (around 6/1/2022) for Diabetes, BRING IN MED BOTTLES AND BP CUFF.      44 minutes spent on the date of the encounter doing chart review, review of test results, interpretation of tests, patient visit and documentation        BMI:   Estimated body mass index is 26.41 kg/m  as calculated from the following:    Height as of this encounter: 1.676 m (5' 6\").    Weight as of this encounter: 74.2 kg (163 lb 9.6 oz).   Weight management plan: Discussed healthy diet and exercise guidelines      Mago Pantoja MD  [facility]     Lars Daley is a 47 year old male who presents to clinic today for the following health issues- DM, HTN, lipids      History of Present Illness       Diabetes:   He presents for follow up of diabetes.  He is checking home blood glucose a few times a month. He checks blood glucose before and after meals.  Blood glucose is never over 200 and never under 70. He is aware of hypoglycemia symptoms including weakness. He is concerned about " other.  He is not experiencing numbness or burning in feet, excessive thirst, blurry vision, weight changes or redness, sores or blisters on feet. The patient has not had a diabetic eye exam in the last 12 months.         He eats 2-3 servings of fruits and vegetables daily.He consumes 1 sweetened beverage(s) daily.He exercises with enough effort to increase his heart rate 20 to 29 minutes per day.  He exercises with enough effort to increase his heart rate 3 or less days per week. He is missing 1 dose(s) of medications per week.     Last appt was in 3/21.      Meds- taking the metformin as he got refills of that, and if he was out, he could take his daughter's metformin.      He ran out of everything else- awhile ago unfortunately.  Doesn't recall having side effects on the other medications lisinopril, atorvastatin or glipizide XL.  He shouldn't have run out of the atorvastatin (1/2 tab daily, and sent in #45 with 3 refills in 3/21), but he says he's been out of that for awhile as well.    He does have a BP machine at home.    Last checked about a month ago, doesn't recall the number.      Had episode of dizziness, woke up wth it, lasted several hrs.  3-4 months ago, called his relative in [place], who said it could be his BP.  Took his BP, and it was high, so thought it was his PB.    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  Able to take time off for appts.    He likely had COVID in 1021- didn't test, but wife got sick soon afterwards, and did test positive for COVID.  He had fevers and body aches, mild taste/smell changes, fatigue.  Wife, was sick, cough and SOB, didn't have to go to the hospital, doing okay now.  He had his third COVID shot a couple weeks ago.      H/o fatty liver on '19 abd ct scan... hadn't been able to address due to minimal appts.    Work-up rec per Up-To-Date...    We obtain the following tests in all patients:  ?Anti-hepatitis C virus antibody.  ?Hepatitis A IgG.  ?Hepatitis B  "surface antigen, surface antibody, and core antibody.  ?Plasma iron, ferritin, and total iron binding capacity.  ?Serum gammaglobulin level, antinuclear antibody, antismooth muscle antibody, and anti-liver/kidney microsomal antibody-1 (see \"Overview of autoimmune hepatitis\", section on 'Diagnostic evaluation').    Other disorders that should be considered based upon th        BP Readings from Last 2 Encounters:   03/01/22 (!) 154/91   07/14/21 137/81     Hemoglobin A1C POCT (%)   Date Value   03/10/2021 7.4 (H)   12/15/2020 10.4 (H)     Hemoglobin A1C (%)   Date Value   03/01/2022 10.2 (H)     LDL Cholesterol Calculated (mg/dL)   Date Value   03/10/2021 79   12/15/2020 116 (H)         Review of Systems   Constitutional, HEENT, cardiovascular, pulmonary, gi and gu systems are negative, except as otherwise noted.      Objective    BP (!) 154/91   Pulse 69   Temp 97.4  F (36.3  C)   Ht 1.676 m (5' 6\")   Wt 74.2 kg (163 lb 9.6 oz)   SpO2 100%   BMI 26.41 kg/m    Body mass index is 26.41 kg/m .  Physical Exam   GENERAL APPEARANCE: healthy, alert and no distress     EYES: PERRL, sclera clear     HENT: nose and mouth without ulcers or lesions     NECK: no adenopathy, no asymmetry, masses, or scars and thyroid normal to palpation     RESP: lungs clear to auscultation - no rales, rhonchi or wheezes     CV: regular rates and rhythm, normal S1 S2, no S3 or S4 and no murmur, click or rub      Abdomen: soft, nontender, no HSM or masses and bowel sounds normal     Ext: warm, dry, no edema         Office Visit on 03/10/2021   Component Date Value Ref Range Status     Hemoglobin A1C POCT 03/10/2021 7.4 (A) 0 - 5.6 % Final    Comment: Normal <5.7% Prediabetes 5.7-6.4%  Diabetes 6.5% or higher - adopted from ADA   consensus guidelines.       Cholesterol 03/10/2021 134  <200 mg/dL Final     Triglycerides 03/10/2021 89  <150 mg/dL Final    Fasting specimen     HDL Cholesterol 03/10/2021 37 (A) >39 mg/dL Final     LDL Cholesterol " Calculated 03/10/2021 79  <100 mg/dL Final    Desirable:       <100 mg/dl     Non HDL Cholesterol 03/10/2021 97  <130 mg/dL Final     Sodium 03/10/2021 137  133 - 144 mmol/L Final     Potassium 03/10/2021 4.2  3.4 - 5.3 mmol/L Final     Chloride 03/10/2021 107  94 - 109 mmol/L Final     Carbon Dioxide 03/10/2021 26  20 - 32 mmol/L Final     Anion Gap 03/10/2021 4  3 - 14 mmol/L Final     Glucose 03/10/2021 107 (A) 70 - 99 mg/dL Final    Fasting specimen     Urea Nitrogen 03/10/2021 13  7 - 30 mg/dL Final     Creatinine 03/10/2021 0.81  0.66 - 1.25 mg/dL Final     GFR Estimate 03/10/2021 >90  >60 mL/min/[1.73_m2] Final    Comment: Non  GFR Calc  Starting 12/18/2018, serum creatinine based estimated GFR (eGFR) will be   calculated using the Chronic Kidney Disease Epidemiology Collaboration   (CKD-EPI) equation.       GFR Estimate If Black 03/10/2021 >90  >60 mL/min/[1.73_m2] Final    Comment:  GFR Calc  Starting 12/18/2018, serum creatinine based estimated GFR (eGFR) will be   calculated using the Chronic Kidney Disease Epidemiology Collaboration   (CKD-EPI) equation.       Calcium 03/10/2021 8.5  8.5 - 10.1 mg/dL Final     Results for orders placed or performed in visit on 03/01/22   HIV Antigen Antibody Combo     Status: Normal   Result Value Ref Range    HIV Antigen Antibody Combo Nonreactive Nonreactive   Hepatitis C Screen Reflex to HCV RNA Quant and Genotype     Status: Normal   Result Value Ref Range    Hepatitis C Antibody Nonreactive Nonreactive    Narrative    Assay performance characteristics have not been established for newborns, infants, and children.   HEMOGLOBIN A1C     Status: Abnormal   Result Value Ref Range    Hemoglobin A1C 10.2 (H) 0.0 - 5.6 %   Hepatitis Antibody A IgG     Status: Abnormal   Result Value Ref Range    Hepatitis A Antibody IgG Reactive (A) Nonreactive    Narrative    This assay cannot be used for the diagnosis of acute HAV infection.   Hepatitis B  surface antigen     Status: Normal   Result Value Ref Range    Hepatitis B Surface Antigen Nonreactive Nonreactive   Hepatitis B Surface Antibody     Status: Abnormal   Result Value Ref Range    Hepatitis B Surface Antibody 0.81 (L) >=12.00 m[IU]/mL   Hepatitis B core antibody     Status: Normal   Result Value Ref Range    Hepatitis B Core Antibody Total Nonreactive Nonreactive   Ferritin     Status: Abnormal   Result Value Ref Range    Ferritin 402 (H) 26 - 388 ng/mL   Iron and iron binding capacity     Status: Normal   Result Value Ref Range    Iron 91 35 - 180 ug/dL    Iron Binding Capacity 339 240 - 430 ug/dL    Iron Sat Index 27 15 - 46 %   IgD     Status: None   Result Value Ref Range    Immunoglobulin D 4.0 <=15.3 mg/dL   Anti Nuclear Junie IgG by IFA with Reflex     Status: Normal   Result Value Ref Range    VALE interpretation Negative Negative   Liver kidney microsomal antibody IgG     Status: None   Result Value Ref Range    Liver-Kidney Micro Antibody <1:20 <1:20   Comprehensive metabolic panel (BMP + Alb, Alk Phos, ALT, AST, Total. Bili, TP)     Status: Abnormal   Result Value Ref Range    Sodium 135 133 - 144 mmol/L    Potassium 4.0 3.4 - 5.3 mmol/L    Chloride 104 94 - 109 mmol/L    Carbon Dioxide (CO2) 21 20 - 32 mmol/L    Anion Gap 10 3 - 14 mmol/L    Urea Nitrogen 15 7 - 30 mg/dL    Creatinine 0.69 0.66 - 1.25 mg/dL    Calcium 8.9 8.5 - 10.1 mg/dL    Glucose 264 (H) 70 - 99 mg/dL    Alkaline Phosphatase 79 40 - 150 U/L    AST 17 0 - 45 U/L    ALT 44 0 - 70 U/L    Protein Total 7.6 6.8 - 8.8 g/dL    Albumin 3.7 3.4 - 5.0 g/dL    Bilirubin Total 0.3 0.2 - 1.3 mg/dL    GFR Estimate >90 >60 mL/min/1.73m2   F Actin EIA with reflex     Status: None   Result Value Ref Range    F-Actin (Smooth Muscle) Ab, IgG by ADRIÁN 4 0 - 19 Units

## 2022-03-02 LAB — ANA SER QL IF: NEGATIVE

## 2022-03-04 LAB
IGD SER-MCNC: 4 MG/DL
SMA IGG SER-ACNC: 4 UNITS

## 2022-03-05 LAB — LKM AB TITR SER IF: NORMAL {TITER}

## 2022-05-08 ENCOUNTER — HEALTH MAINTENANCE LETTER (OUTPATIENT)
Age: 47
End: 2022-05-08

## 2022-07-03 ENCOUNTER — HEALTH MAINTENANCE LETTER (OUTPATIENT)
Age: 47
End: 2022-07-03

## 2022-07-27 DIAGNOSIS — E78.5 HYPERLIPIDEMIA LDL GOAL <100: ICD-10-CM

## 2022-07-28 RX ORDER — ATORVASTATIN CALCIUM 20 MG/1
TABLET, FILM COATED ORAL
Qty: 15 TABLET | Refills: 0 | Status: SHIPPED | OUTPATIENT
Start: 2022-07-28 | End: 2023-09-18

## 2022-07-28 NOTE — TELEPHONE ENCOUNTER
Medication is being filled for 1 time refill only due to:  Patient needs to be seen because due for DM follow up and fasting labs.      Note from 3/1/22 OV:     Return in about 3 months (around 6/1/2022) for Diabetes, BRING IN MED BOTTLES AND BP CUFF.    Agnes Yusuf RN

## 2023-01-14 ENCOUNTER — HEALTH MAINTENANCE LETTER (OUTPATIENT)
Age: 48
End: 2023-01-14

## 2023-04-23 ENCOUNTER — HEALTH MAINTENANCE LETTER (OUTPATIENT)
Age: 48
End: 2023-04-23

## 2023-06-02 ENCOUNTER — HEALTH MAINTENANCE LETTER (OUTPATIENT)
Age: 48
End: 2023-06-02

## 2023-09-18 ENCOUNTER — OFFICE VISIT (OUTPATIENT)
Dept: INTERNAL MEDICINE | Facility: CLINIC | Age: 48
End: 2023-09-18

## 2023-09-18 VITALS
WEIGHT: 159.9 LBS | HEART RATE: 71 BPM | BODY MASS INDEX: 25.81 KG/M2 | SYSTOLIC BLOOD PRESSURE: 148 MMHG | OXYGEN SATURATION: 97 % | TEMPERATURE: 97.9 F | DIASTOLIC BLOOD PRESSURE: 92 MMHG

## 2023-09-18 DIAGNOSIS — B02.9 HERPES ZOSTER WITHOUT COMPLICATION: Primary | ICD-10-CM

## 2023-09-18 PROCEDURE — 99213 OFFICE O/P EST LOW 20 MIN: CPT | Performed by: INTERNAL MEDICINE

## 2023-09-18 RX ORDER — VALACYCLOVIR HYDROCHLORIDE 1 G/1
1000 TABLET, FILM COATED ORAL 3 TIMES DAILY
Qty: 21 TABLET | Refills: 0 | Status: SHIPPED | OUTPATIENT
Start: 2023-09-18 | End: 2023-12-11

## 2023-09-18 RX ORDER — TRAMADOL HYDROCHLORIDE 50 MG/1
50 TABLET ORAL EVERY 6 HOURS PRN
Qty: 30 TABLET | Refills: 1 | Status: SHIPPED | OUTPATIENT
Start: 2023-09-18

## 2023-09-18 NOTE — PATIENT INSTRUCTIONS
You have shingles.    START Valtrex 3x/day x 1 week.    Ultram 1-2 tabs every 6 hours as needed for pain.

## 2023-09-18 NOTE — PROGRESS NOTES
ASSESSMENT/PLAN                                                      (B02.9) Herpes zoster without complication  (primary encounter diagnosis)  Comment: left mid thoracic dermatome.  Plan: Valtrex prescribed; Ultram as needed for pain relief; if symptoms worsen, change, or do not improve, patient to contact MD.      Regla Flores MD   [address] [address]  T: [phone], F: [phone]    SUBJECTIVE                                                      Lars Daley is a very pleasant 48 year old male who presents left-sided abdominal and back pain:    Started last week as left lateral abdominal pain. Pain has progressed towards midline, left side, anteriorly and posteriorly. He also recently developed a painful rash left mid back. No improvement with OTC analgesics.    No nausea, vomiting, or change in bowel movements.  No fevers or chills.    OBJECTIVE                                                      BP (!) 148/92   Pulse 71   Temp 97.9  F (36.6  C) (Temporal)   Wt 72.5 kg (159 lb 14.4 oz)   SpO2 97%   BMI 25.81 kg/m    Constitutional: well-appearing  Gastrointestinal: soft, non-tender, and non-distended; no organomegaly or masses  Integumentary: multiple vesicles on erythematous bases left lateral back (~T7 dermatome)  Psych: normal judgment and insight; normal mood and affect; recent and remote memory intact    ---    (Note documentation was completed, in part, with [email or web address] voice-recognition software. Documentation was reviewed, but some grammatical, spelling, and word errors may remain.)

## 2023-09-24 ENCOUNTER — HEALTH MAINTENANCE LETTER (OUTPATIENT)
Age: 48
End: 2023-09-24

## 2023-12-08 ENCOUNTER — OFFICE VISIT (OUTPATIENT)
Dept: FAMILY MEDICINE | Facility: CLINIC | Age: 48
End: 2023-12-08

## 2023-12-08 VITALS
BODY MASS INDEX: 26.32 KG/M2 | RESPIRATION RATE: 16 BRPM | OXYGEN SATURATION: 97 % | DIASTOLIC BLOOD PRESSURE: 73 MMHG | TEMPERATURE: 97.9 F | SYSTOLIC BLOOD PRESSURE: 121 MMHG | HEART RATE: 75 BPM | WEIGHT: 154.2 LBS | HEIGHT: 64 IN

## 2023-12-08 DIAGNOSIS — R80.9 MICROALBUMINURIA: ICD-10-CM

## 2023-12-08 DIAGNOSIS — E11.9 TYPE 2 DIABETES MELLITUS WITHOUT COMPLICATION, WITHOUT LONG-TERM CURRENT USE OF INSULIN (H): ICD-10-CM

## 2023-12-08 DIAGNOSIS — Z12.11 SCREEN FOR COLON CANCER: ICD-10-CM

## 2023-12-08 DIAGNOSIS — I10 BENIGN ESSENTIAL HYPERTENSION: ICD-10-CM

## 2023-12-08 DIAGNOSIS — B02.29 POST HERPETIC NEURALGIA: Primary | ICD-10-CM

## 2023-12-08 DIAGNOSIS — E78.5 HYPERLIPIDEMIA LDL GOAL <100: ICD-10-CM

## 2023-12-08 DIAGNOSIS — E11.22 TYPE 2 DIABETES MELLITUS WITH STAGE 1 CHRONIC KIDNEY DISEASE, WITHOUT LONG-TERM CURRENT USE OF INSULIN (H): ICD-10-CM

## 2023-12-08 DIAGNOSIS — K76.0 FATTY LIVER DISEASE, NONALCOHOLIC: ICD-10-CM

## 2023-12-08 DIAGNOSIS — N18.1 TYPE 2 DIABETES MELLITUS WITH STAGE 1 CHRONIC KIDNEY DISEASE, WITHOUT LONG-TERM CURRENT USE OF INSULIN (H): ICD-10-CM

## 2023-12-08 LAB
ALBUMIN SERPL BCG-MCNC: 4.7 G/DL (ref 3.5–5.2)
ALP SERPL-CCNC: 72 U/L (ref 40–150)
ALT SERPL W P-5'-P-CCNC: 24 U/L (ref 0–70)
ANION GAP SERPL CALCULATED.3IONS-SCNC: 11 MMOL/L (ref 7–15)
AST SERPL W P-5'-P-CCNC: 24 U/L (ref 0–45)
BILIRUB SERPL-MCNC: 0.4 MG/DL
BUN SERPL-MCNC: 13.3 MG/DL (ref 6–20)
CALCIUM SERPL-MCNC: 9.4 MG/DL (ref 8.6–10)
CHLORIDE SERPL-SCNC: 104 MMOL/L (ref 98–107)
CHOLEST SERPL-MCNC: 172 MG/DL
CREAT SERPL-MCNC: 0.72 MG/DL (ref 0.67–1.17)
CREAT UR-MCNC: 125 MG/DL
DEPRECATED HCO3 PLAS-SCNC: 26 MMOL/L (ref 22–29)
EGFRCR SERPLBLD CKD-EPI 2021: >90 ML/MIN/1.73M2
FASTING STATUS PATIENT QL REPORTED: ABNORMAL
GLUCOSE SERPL-MCNC: 133 MG/DL (ref 70–99)
HBA1C MFR BLD: 8.8 % (ref 0–5.6)
HDLC SERPL-MCNC: 40 MG/DL
LDLC SERPL CALC-MCNC: 111 MG/DL
MICROALBUMIN UR-MCNC: 19.5 MG/L
MICROALBUMIN/CREAT UR: 15.6 MG/G CR (ref 0–17)
NONHDLC SERPL-MCNC: 132 MG/DL
POTASSIUM SERPL-SCNC: 4.2 MMOL/L (ref 3.4–5.3)
PROT SERPL-MCNC: 7.5 G/DL (ref 6.4–8.3)
SODIUM SERPL-SCNC: 141 MMOL/L (ref 135–145)
TRIGL SERPL-MCNC: 107 MG/DL
TSH SERPL DL<=0.005 MIU/L-ACNC: 0.8 UIU/ML (ref 0.3–4.2)

## 2023-12-08 PROCEDURE — 80053 COMPREHEN METABOLIC PANEL: CPT | Performed by: FAMILY MEDICINE

## 2023-12-08 PROCEDURE — 91320 SARSCV2 VAC 30MCG TRS-SUC IM: CPT | Performed by: FAMILY MEDICINE

## 2023-12-08 PROCEDURE — 82570 ASSAY OF URINE CREATININE: CPT | Performed by: FAMILY MEDICINE

## 2023-12-08 PROCEDURE — 83036 HEMOGLOBIN GLYCOSYLATED A1C: CPT | Performed by: FAMILY MEDICINE

## 2023-12-08 PROCEDURE — 90686 IIV4 VACC NO PRSV 0.5 ML IM: CPT | Performed by: FAMILY MEDICINE

## 2023-12-08 PROCEDURE — 90471 IMMUNIZATION ADMIN: CPT | Performed by: FAMILY MEDICINE

## 2023-12-08 PROCEDURE — 82043 UR ALBUMIN QUANTITATIVE: CPT | Performed by: FAMILY MEDICINE

## 2023-12-08 PROCEDURE — 80061 LIPID PANEL: CPT | Performed by: FAMILY MEDICINE

## 2023-12-08 PROCEDURE — 36415 COLL VENOUS BLD VENIPUNCTURE: CPT | Performed by: FAMILY MEDICINE

## 2023-12-08 PROCEDURE — 90480 ADMN SARSCOV2 VAC 1/ONLY CMP: CPT | Performed by: FAMILY MEDICINE

## 2023-12-08 PROCEDURE — 99214 OFFICE O/P EST MOD 30 MIN: CPT | Mod: 25 | Performed by: FAMILY MEDICINE

## 2023-12-08 PROCEDURE — 84443 ASSAY THYROID STIM HORMONE: CPT | Performed by: FAMILY MEDICINE

## 2023-12-08 RX ORDER — GABAPENTIN 300 MG/1
300 CAPSULE ORAL 3 TIMES DAILY
Qty: 90 CAPSULE | Refills: 1 | Status: SHIPPED | OUTPATIENT
Start: 2023-12-08

## 2023-12-08 ASSESSMENT — ANXIETY QUESTIONNAIRES
2. NOT BEING ABLE TO STOP OR CONTROL WORRYING: NOT AT ALL
GAD7 TOTAL SCORE: 1
1. FEELING NERVOUS, ANXIOUS, OR ON EDGE: NOT AT ALL
3. WORRYING TOO MUCH ABOUT DIFFERENT THINGS: SEVERAL DAYS
GAD7 TOTAL SCORE: 1
4. TROUBLE RELAXING: NOT AT ALL
IF YOU CHECKED OFF ANY PROBLEMS ON THIS QUESTIONNAIRE, HOW DIFFICULT HAVE THESE PROBLEMS MADE IT FOR YOU TO DO YOUR WORK, TAKE CARE OF THINGS AT HOME, OR GET ALONG WITH OTHER PEOPLE: NOT DIFFICULT AT ALL
7. FEELING AFRAID AS IF SOMETHING AWFUL MIGHT HAPPEN: NOT AT ALL
5. BEING SO RESTLESS THAT IT IS HARD TO SIT STILL: NOT AT ALL
6. BECOMING EASILY ANNOYED OR IRRITABLE: NOT AT ALL

## 2023-12-08 ASSESSMENT — PATIENT HEALTH QUESTIONNAIRE - PHQ9
SUM OF ALL RESPONSES TO PHQ QUESTIONS 1-9: 1
SUM OF ALL RESPONSES TO PHQ QUESTIONS 1-9: 1
10. IF YOU CHECKED OFF ANY PROBLEMS, HOW DIFFICULT HAVE THESE PROBLEMS MADE IT FOR YOU TO DO YOUR WORK, TAKE CARE OF THINGS AT HOME, OR GET ALONG WITH OTHER PEOPLE: NOT DIFFICULT AT ALL

## 2023-12-08 ASSESSMENT — PAIN SCALES - GENERAL: PAINLEVEL: MILD PAIN (2)

## 2023-12-08 NOTE — PROGRESS NOTES
Assessment & Plan     ICD-10-CM    1. Post herpetic neuralgia  B02.29 gabapentin (NEURONTIN) 300 MG capsule      2. Type 2 diabetes mellitus with stage 1 chronic kidney disease, without long-term current use of insulin (H)  E11.22 Adult Eye  Referral    N18.1 Lipid panel reflex to direct LDL Fasting     Comprehensive metabolic panel (BMP + Alb, Alk Phos, ALT, AST, Total. Bili, TP)     Hemoglobin A1c     Albumin Random Urine Quantitative with Creat Ratio     TSH with free T4 reflex     Primary Care - Care Coordination Referral     Lipid panel reflex to direct LDL Fasting     Comprehensive metabolic panel (BMP + Alb, Alk Phos, ALT, AST, Total. Bili, TP)     Hemoglobin A1c     Albumin Random Urine Quantitative with Creat Ratio     TSH with free T4 reflex     glipiZIDE (GLUCOTROL XL) 10 MG 24 hr tablet     lisinopril (ZESTRIL) 10 MG tablet      3. Fatty liver disease, nonalcoholic  K76.0 CBC with platelets     Ferritin      4. Benign essential hypertension  I10       5. Hyperlipidemia LDL goal <100  E78.5 atorvastatin (LIPITOR) 20 MG tablet      6. Screen for colon cancer  Z12.11 Colonoscopy Screening  Referral      7. Type 2 diabetes mellitus without complication, without long-term current use of insulin (H)  E11.9 metFORMIN (GLUCOPHAGE XR) 500 MG 24 hr tablet      8. Microalbuminuria  R80.9 lisinopril (ZESTRIL) 10 MG tablet         47yo with DM II, NAFLD, HTN and hyperlipidemia, who has not been back for follow-up or for appt or labs since 3/22.  He still does not have insurance, but is okay paying for appt and labs today out of pocket.  Is fasting- and he would like to get full panel. He is also up for getting a referral for a colonoscopy.  He has been trying to get insurance, but did not want to have to change to [facility] clinics.  Will refer to care coordination to see if they have any ideas for him.  Discussed that we absolutely want to keep seeing him here, but also want to make sure he's getting  the cares he needs.    Post-shingles neuralgia- itching on mid abd and back, and nerve pain on left abd/side.  Shingles in 9/23, was seen at [facility] and was started on valtrex.  Unfortunately continues with post-herpetic neuralgia sx's, though is improving.  At the worst, he was unable to eat much, now that is improving.  Sleep still negatively affected.  --Will start gabapentin 300mg- ramp up to 300mg 3x/day over the next three days.  Risks and benefits of medication(s) including potential side effects reviewed with patient.  If se's, contact us and we can try and switch to lyrica or amitriptyline (does not need appt).    DM II/HTN/Lipids-  Mostly fasting today (banana 7 hrs ago). Will check full panel today.  A1C back at 8.8, down from 10's at last check in 3/22.  He has been taking metformin at full 2g/d dose, and glipizide at 5mg/d.  For HTN, taking lisinopril 10mg/d, and BP looks good today.  Labs pending.  For lipids, taking atorvastatin 10mg/d, labs pending.  Reports no concerning se's on meds.  Would like to have him see MTM, hopefully when back on insurance.    Mago Pantoja MD  [facility]            Kori oSusa is a 48 year old, presenting for the following health issues:  Diabetes and RECHECK (Herpes zoster)        12/8/2023     1:15 PM   Additional Questions   Roomed by Fang HALE   Accompanied by n/a       History of Present Illness       Diabetes:   He presents for follow up of diabetes.  He is checking home blood glucose a few times a month.   He checks blood glucose before meals.  Blood glucose is sometimes over 200 and never under 70. He is aware of hypoglycemia symptoms including shakiness.    He has no concerns regarding his diabetes at this time.  He is having weight loss.  The patient has not had a diabetic eye exam in the last 12 months.          He eats 2-3 servings of fruits and vegetables daily.He consumes 1 sweetened beverage(s) daily.He exercises with  "enough effort to increase his heart rate 30 to 60 minutes per day.  He exercises with enough effort to increase his heart rate 5 days per week. He is missing 1 dose(s) of medications per week.  He is not taking prescribed medications regularly due to remembering to take.    Pt continue having herpes zoster symptoms.    Left abd- 6-7/10 pain - has to take ibuprofen, helps a bit.  Taking ibuprofen about once daily  Itchy sensation on mid abd and left back.    Pain affecting sleep.  Hard to eat, gives him nausea, and just too much pain/burning.  Starting to get better in terms of nausea.      DM II- pt has been continuing to take meds- sent in 9/23 x 3 months, but req urgent appt as last set of labs from 3/22.  Pt states he's been taking meds x 4 consistently for past 3 months.  Mostly fasting today for labs (had banana ~7 hrs ago only).    NAFLD-   Lab w/up in 3/22, mostly okay, though ferritin high- will recheck today with CBC.  Hep A IgG positive.  Hep B IgG neg- consider Hep B vaccines.        Review of Systems   Constitutional, HEENT, cardiovascular, pulmonary, gi and gu systems are negative, except as otherwise noted.      Objective    /73 (BP Location: Left arm, Patient Position: Sitting, Cuff Size: Adult Regular)   Pulse 75   Temp 97.9  F (36.6  C) (Temporal)   Resp 16   Ht 1.63 m (5' 4.17\")   Wt 69.9 kg (154 lb 3.2 oz)   SpO2 97%   BMI 26.33 kg/m    Body mass index is 26.33 kg/m .  Physical Exam   GENERAL: healthy, alert and no distress  NECK: no adenopathy, no asymmetry, masses, or scars and thyroid normal to palpation  RESP: lungs clear to auscultation - no rales, rhonchi or wheezes  CV: regular rate and rhythm, normal S1 S2, no S3 or S4, no murmur, click or rub, no peripheral edema and peripheral pulses strong  ABDOMEN: soft, nontender, no hepatosplenomegaly, no masses and bowel sounds normal  MS: no gross musculoskeletal defects noted, no edema  PSYCH: mentation appears normal, affect " normal/bright      Office Visit on 03/01/2022   Component Date Value Ref Range Status    HIV Antigen Antibody Combo 03/01/2022 Nonreactive  Nonreactive Final    HIV-1 p24 Ag & HIV-1/HIV-2 Ab Not Detected    Hepatitis C Antibody 03/01/2022 Nonreactive  Nonreactive Final    Hemoglobin A1C 03/01/2022 10.2 (H)  0.0 - 5.6 % Final    Normal <5.7%   Prediabetes 5.7-6.4%    Diabetes 6.5% or higher     Note: Adopted from ADA consensus guidelines.    Hepatitis A Antibody IgG 03/01/2022 Reactive (A)  Nonreactive Final    Hepatitis B Surface Antigen 03/01/2022 Nonreactive  Nonreactive Final    Hepatitis B Surface Antibody Instr* 03/01/2022 0.81 (L)  >=12.00 m[IU]/mL Final    Nonreactive, No antibody detected when the value is less than 8.00 mIU/mL.    Hepatitis B Core Antibody Total 03/01/2022 Nonreactive  Nonreactive Final    Ferritin 03/01/2022 402 (H)  26 - 388 ng/mL Final    Iron 03/01/2022 91  35 - 180 ug/dL Final    Iron Binding Capacity 03/01/2022 339  240 - 430 ug/dL Final    Iron Sat Index 03/01/2022 27  15 - 46 % Final    Immunoglobulin D 03/01/2022 4.0  <=15.3 mg/dL Final    INTERPRETIVE INFORMATION:  Immunoglobulin D, Serum    IgD is one of the five classes of immunoglobulin. IgD is   mainly found on the surface of B-cells and may help   regulate B-cell function. IgD likely serves as an early   B-cell antigen receptor, however, the function of the   circulating IgD is largely unknown.  Performed By: [facility]  [address] [place] [id number]  : Joaquina Park MD    VALE interpretation 03/01/2022 Negative  Negative Final      Negative:              <1:40  Borderline Positive:   1:40 - 1:80  Positive:              >1:80    Liver-Kidney Micro Antibody 03/01/2022 <1:20  <1:20 Final    Comment: INTERPRETIVE INFORMATION:  Liver-Kidney-Microsome Abs, IgG    Liver-Kidney Microsome IgG antibody (anti-LKM), as detected   by indirect immunofluorescent antibody (IFA) techniques,   may be  observed in patients with autoimmune hepatitis type   2 (AIH-2), AIH-2 associated with autoimmune   ssjzzwzqplllfobezq-dcncugbqgev-eztfzqctxr dystrophy   (APECED), viral hepatitis C or D, and some forms of   drug-induced hepatitis. This IFA does not differentiate   among the four types of LKM antibodies (LKM-1, LKM-2,   LKM-3, and a fourth type that recognizes CY and CY   antigens). Of these, anti-LKM-1 (cytochrome [id number]) IgG   antibodies are considered specific for AIH-2.    This test was developed and its performance characteristics   determined by [facility]. It has not been cleared or   approved by the US Food and Drug Administration. This test   was performed in a CLIA certified laboratory and is   intended for clinical purposes.  Performed By: [facility]  [address] [place] [id number]  : Joaquina Park MD    Sodium 2022 135  133 - 144 mmol/L Final    Potassium 2022 4.0  3.4 - 5.3 mmol/L Final    Chloride 2022 104  94 - 109 mmol/L Final    Carbon Dioxide (CO2) 2022 21  20 - 32 mmol/L Final    Anion Gap 2022 10  3 - 14 mmol/L Final    Urea Nitrogen 2022 15  7 - 30 mg/dL Final    Creatinine 2022 0.69  0.66 - 1.25 mg/dL Final    Calcium 2022 8.9  8.5 - 10.1 mg/dL Final    Glucose 2022 264 (H)  70 - 99 mg/dL Final    Alkaline Phosphatase 2022 79  40 - 150 U/L Final    AST 2022 17  0 - 45 U/L Final    ALT 2022 44  0 - 70 U/L Final    Protein Total 2022 7.6  6.8 - 8.8 g/dL Final    Albumin 2022 3.7  3.4 - 5.0 g/dL Final    Bilirubin Total 2022 0.3  0.2 - 1.3 mg/dL Final    GFR Estimate 2022 >90  >60 mL/min/1.73m2 Final    Effective 2021 eGFRcr in adults is calculated using the  CKD-EPI creatinine equation which includes age and gender (Christopher et al., NEJM, DOI: 10.1056/UXPTil2253056)    F-Actin (Smooth Muscle) Ab, IgG by*  03/01/2022 4  0 - 19 Units Final    Comment:    If F-Actin (Smooth Muscle) Antibody, IgG is negative, the   Smooth Muscle Antibody titer by IFA is not performed.  REFERENCE INTERVAL: F-Actin (Smooth Muscle) Antibody, IgG   by                       ADRIÁN      19 Units or less ....... Negative    20 - 30 Units .......... Weak Positive-Suggest repeat                             testing in two to three weeks                             with fresh specimen.    31 Units or greater..... Positive-Suggestive of                             autoimmune hepatitis type 1                             or chronic active hepatitis.    F-actin IgG antibodies have been shown to have increased   sensitivity for autoimmune hepatitis (AIH) but lower   specificity than smooth muscle antibodies (SMA). F-actin   IgG antibodies can also be seen in SMA-negative disease   controls (non-AIH), especially in patients with primary   biliary cirrhosis and chronic hepatitis C infections. Some   patients with AIH may be SMA-positive but negative for   F-actin IgG.                            Consider testing for SMA by IFA if suspicion   for AIH is strong.  Performed By: [facility]  [address] [place] [id number]  : Joaquina Park MD     Results for orders placed or performed in visit on 12/08/23   Lipid panel reflex to direct LDL Fasting     Status: Abnormal   Result Value Ref Range    Cholesterol 172 <200 mg/dL    Triglycerides 107 <150 mg/dL    Direct Measure HDL 40 >=40 mg/dL    LDL Cholesterol Calculated 111 (H) <=100 mg/dL    Non HDL Cholesterol 132 (H) <130 mg/dL    Patient Fasting > 8hrs? Unknown     Narrative    Cholesterol  Desirable:  <200 mg/dL    Triglycerides  Normal:  Less than 150 mg/dL  Borderline High:  150-199 mg/dL  High:  200-499 mg/dL  Very High:  Greater than or equal to 500 mg/dL    Direct Measure HDL  Female:  Greater than or equal to 50 mg/dL   Male:  Greater than or equal to 40  mg/dL    LDL Cholesterol  Desirable:  <100mg/dL  Above Desirable:  100-129 mg/dL   Borderline High:  130-159 mg/dL   High:  160-189 mg/dL   Very High:  >= 190 mg/dL    Non HDL Cholesterol  Desirable:  130 mg/dL  Above Desirable:  130-159 mg/dL  Borderline High:  160-189 mg/dL  High:  190-219 mg/dL  Very High:  Greater than or equal to 220 mg/dL   Comprehensive metabolic panel (BMP + Alb, Alk Phos, ALT, AST, Total. Bili, TP)     Status: Abnormal   Result Value Ref Range    Sodium 141 135 - 145 mmol/L    Potassium 4.2 3.4 - 5.3 mmol/L    Carbon Dioxide (CO2) 26 22 - 29 mmol/L    Anion Gap 11 7 - 15 mmol/L    Urea Nitrogen 13.3 6.0 - 20.0 mg/dL    Creatinine 0.72 0.67 - 1.17 mg/dL    GFR Estimate >90 >60 mL/min/1.73m2    Calcium 9.4 8.6 - 10.0 mg/dL    Chloride 104 98 - 107 mmol/L    Glucose 133 (H) 70 - 99 mg/dL    Alkaline Phosphatase 72 40 - 150 U/L    AST 24 0 - 45 U/L    ALT 24 0 - 70 U/L    Protein Total 7.5 6.4 - 8.3 g/dL    Albumin 4.7 3.5 - 5.2 g/dL    Bilirubin Total 0.4 <=1.2 mg/dL   Hemoglobin A1c     Status: Abnormal   Result Value Ref Range    Hemoglobin A1C 8.8 (H) 0.0 - 5.6 %   Albumin Random Urine Quantitative with Creat Ratio     Status: None   Result Value Ref Range    Creatinine Urine mg/dL 125.0 mg/dL    Albumin Urine mg/L 19.5 mg/L    Albumin Urine mg/g Cr 15.60 0.00 - 17.00 mg/g Cr   TSH with free T4 reflex     Status: Normal   Result Value Ref Range    TSH 0.80 0.30 - 4.20 uIU/mL

## 2023-12-08 NOTE — NURSING NOTE
Prior to immunization administration, verified patients identity using patient s name and date of birth. Please see Immunization Activity for additional information.     Screening Questionnaire for Adult Immunization    Are you sick today?   No   Do you have allergies to medications, food, a vaccine component or latex?   No   Have you ever had a serious reaction after receiving a vaccination?   No   Do you have a long-term health problem with heart, lung, kidney, or metabolic disease (e.g., diabetes), asthma, a blood disorder, no spleen, complement component deficiency, a cochlear implant, or a spinal fluid leak?  Are you on long-term aspirin therapy?   No   Do you have cancer, leukemia, HIV/AIDS, or any other immune system problem?   No   Do you have a parent, brother, or sister with an immune system problem?   No   In the past 3 months, have you taken medications that affect  your immune system, such as prednisone, other steroids, or anticancer drugs; drugs for the treatment of rheumatoid arthritis, Crohn s disease, or psoriasis; or have you had radiation treatments?   No   Have you had a seizure, or a brain or other nervous system problem?   No   During the past year, have you received a transfusion of blood or blood    products, or been given immune (gamma) globulin or antiviral drug?   No   For women: Are you pregnant or is there a chance you could become       pregnant during the next month?   No   Have you received any vaccinations in the past 4 weeks?   No     Immunization questionnaire answers were all negative.        Patient instructed to remain in clinic for 15 minutes afterwards, and to report any adverse reactions.     Screening performed by Cooper Soriano MA on 12/8/2023 at 2:15 PM.

## 2023-12-11 ENCOUNTER — PATIENT OUTREACH (OUTPATIENT)
Dept: CARE COORDINATION | Facility: CLINIC | Age: 48
End: 2023-12-11

## 2023-12-11 RX ORDER — ATORVASTATIN CALCIUM 20 MG/1
10 TABLET, FILM COATED ORAL DAILY
Qty: 45 TABLET | Refills: 3 | Status: SHIPPED | OUTPATIENT
Start: 2023-12-11

## 2023-12-11 RX ORDER — GLIPIZIDE 10 MG/1
10 TABLET, FILM COATED, EXTENDED RELEASE ORAL DAILY
Qty: 90 TABLET | Refills: 1 | Status: SHIPPED | OUTPATIENT
Start: 2023-12-11

## 2023-12-11 RX ORDER — METFORMIN HCL 500 MG
1000 TABLET, EXTENDED RELEASE 24 HR ORAL 2 TIMES DAILY
Qty: 360 TABLET | Refills: 1 | Status: SHIPPED | OUTPATIENT
Start: 2023-12-11

## 2023-12-11 RX ORDER — LISINOPRIL 10 MG/1
10 TABLET ORAL DAILY
Qty: 90 TABLET | Refills: 1 | Status: SHIPPED | OUTPATIENT
Start: 2023-12-11

## 2023-12-11 NOTE — RESULT ENCOUNTER NOTE
--Your hemoglobin A1C (the three month average of your glucose levels) is still too high at 8.8 as we discussed, but it is still better than the 10.2 in 3/22 which is good.  I would recommend continuing on the metformin at 2000mg total/day, and let's increase the glipizide XL from 5mg/day to 10mg/day.  I'll send in new prescriptions for these medications.  --Your cholesterol panel looks pretty good with a fairly low LDL (the bad cholesterol) and a just normal HDL (the good cholesterol).  We can have you continue on the lipitor at the 10mg/day dose for now.  --Your comprehensive metabolic panel (CMP, which includes electrolyte levels, blood sugar levels, and kidney and liver function tests) looks good other than the glucose.  --Your urine microalbumin level (which is the urine test that can signal signs of early chronic kidney disease if elevated to >30) is normal which is good.  --Your TSH (thyroid stimulating hormone, which is elevated in hypothyroidism, and lowered in hyperthyroidism) is in the normal range- also good.    It looks like they were not able to get your CBC labs or ferritin labs (which includes blood labs looking for signs of infection or anemia) when they alejandro your blood, so we should get those next time. We should also start the hepatitis B vaccine series at your next appointment.    Please let me know if you have any questions, and make sure to schedule an appointment very soon (here hopefully!) to come back in the next 2-3 months so the focus can be on your diabetes and liver/hepatitis B vaccines.    Best,   Erick Pantoja MD

## 2023-12-11 NOTE — PROGRESS NOTES
Clinic Care Coordination Contact  Community Health Worker Initial Outreach    CHW Initial Information Gathering:  Referral Source: PCP  Current living arrangement:: I live in a private home with family (2 adult daughters and a 6 yo son, spouse)  Type of residence:: Private home - stairs  Community Resources: None  Supplies Currently Used at Home: Diabetic Supplies (glucometer)  Equipment Currently Used at Home: none  Informal Support system:: Spouse  No PCP office visit in Past Year: No  Transportation means:: Regular car  CHW Additional Questions  If ED/Hospital discharge, follow-up appointment scheduled as recommended?: N/A  Medication changes made following ED/Hospital discharge?: N/A  MyChart active?: Yes  Patient sent Social Determinants of Health questionnaire?: Yes    Patient accepts CC: Yes. Patient scheduled for assessment with NI Hernandes RN, on 12/12/23 at 2:00 pm. Patient noted desire to discuss diabetes management and insurance (FRW referral placed).       Financial Resource Worker Screening    [facility] Benefits  Is patient requesting help applying for [facility] benefits?: No    Insurance:  [place] No  Is this an insurance renewal?: No  Is this a new insurance application request?: Yes  Have you recently applied for insurance?: No  How many people in your household? : 5  Do you file taxes?: Yes  How many dependents do you claim?: 1  What is the monthly gross income for the household (wages, social security, workers comp, and pension)? : 3000    [facility]: Pt is interesting in lj care for his [facility] bill (pending bill from 12/8/23 visit).    Spoke to pt this afternoon:  Insurance - pt has not had insurance for 2 years when they had [place] through [facility] (pt wasn't completely sure about this), but then [facility] so they could not longer get insurance through them. Pt's spouse has arthritis, so would like to have an insurance plan again to assist her  with her medical visits. Spouse is currently looking for different insurance plans. Pt would like to stay with [facility] and not have to utilize another health care system such as [facility] or [facility].  Stable housing - yes  Food - denies food insecurity  DM - pt did discuss with Dr. Pantoja on 12/8/23, however, would like to discuss with NI Hill RN, as well. Pt had Shingles 9/2023 and is still recovering from it.    Lelia Bravo  Community Health Worker  [facility]  [phone]

## 2023-12-12 ENCOUNTER — PATIENT OUTREACH (OUTPATIENT)
Dept: CARE COORDINATION | Facility: CLINIC | Age: 48
End: 2023-12-12

## 2023-12-12 NOTE — PROGRESS NOTES
Clinic Care Coordination Contact  Care Team Conversations    Liz, CC RN, is out ill today.  Called pt to reschedule initial CC assessment to 12/18/23 at 2:00 pm.    CHW Plan: Pt's appointment desk changed to reflect the above.    Lelia Bravo  Community Health Worker  [facility]  [phone]

## 2023-12-12 NOTE — PROGRESS NOTES
Clinic Care Coordination Contact  Program: [facility] CARE /INSURANCE   County: [facility] Case #:  [facility] Worker:   Mnsure #:   Subscriber #:   Renewal:  Date Applied:     FR Outreach:   23:   Outreach attempted x 1. Left message on voicemail with call back information and requested return call.  Plan: FRW will call again within one week.    Ann Raphael  Care   [facility]  Clinic Care Coordination  [phone]    [facility] Care Application Screenin. How many people in household?  5  2. Do you file taxes, who do you file with (need 1040)? Yes  3. What is your monthly income (need 2 recent paystubs)? $3000.00  4. Do you have a bank account, need recent statement?      Health Insurance:      Referral/Screening:  [facility] Screening      Row Name [id number]       [facility] Benefits   Is patient requesting help applying for [facility] benefits? No       Insurance:   [place]? No       Is this an insurance renewal? No       Is this a new insurance application request? Yes       Have you recently applied for insurance? No       How many people in your household? 5       Do you file taxes? Yes       How many dependents do you claim? 1       What is the monthly gross income for the household (wages, social security, workers comp, and pension)? 3000       OTHER   Is this a lj care application? Yes

## 2023-12-18 ENCOUNTER — PATIENT OUTREACH (OUTPATIENT)
Dept: NURSING | Facility: CLINIC | Age: 48
End: 2023-12-18

## 2023-12-18 ASSESSMENT — ACTIVITIES OF DAILY LIVING (ADL): DEPENDENT_IADLS:: INDEPENDENT

## 2023-12-18 NOTE — LETTER
[facility]  Patient Centered Plan of Care  About Me:        Patient Name:  Lars Daley    YOB: 1975  Age:         48 year old   Mary MRN:    [id number] Telephone Information:  Home Phone [phone]   Mobile [phone]       Address:  [address]  [facility] [address] Email address:  [email or web address]      Emergency Contact(s)    Name Relationship Lgl Grd Work Phone Home Phone Mobile Phone   1. TIP ENRIQUEZ* Spouse No [phone]           Primary language:  Sinhala     needed? No   [facility] [employer]:  [phone] op. 1  Other communication barriers:No data recorded  Preferred Method of Communication:  Mail  Current living arrangement: I live in a private home with family (2 adult daughters and a 8 yo son, spouse)    Mobility Status/ Medical Equipment: Independent        Health Maintenance  Health Maintenance Reviewed: No data recorded    My Access Plan  Medical Emergency 911   Primary Clinic Line [facility] [phone]   24 Hour Appointment Line [phone] or  [phone]) (toll-free)   24 Hour Nurse Line [phone] (toll-free)   Preferred Urgent Care [facility], [phone]     Preferred Hospital [facility]  [phone]     Preferred Pharmacy [id number] - [place], [place] - [address] AT [facility] [address]     Behavioral Health Crisis Line The National Suicide Prevention Lifeline at 1-777.424.2782 or Text/Call 768           My Care Team Members  Patient Care Team         Relationship Specialty Notifications Start End    Mago Pantoja MD PCP - General Family Practice  1/20/12     Phone: [phone] Fax: [phone] [address] [address]    Regla Flores MD Assigned PCP   9/23/23     Phone: [phone] Fax: [phone]         [address]  [facility] [address]    Gerard Irizarry OD  Optometry  12/8/23     Phone: [phone] Fax: [phone]         [address] [facility] [address]    Mago Pantoja MD Referring Physician Family Medicine  12/8/23     Referring Physician to eye    Phone: [phone] Fax: [phone] [address] [place] [address] [facility] [address]    Liz Soriano, RN Lead Care Coordinator  Admissions 12/11/23     Julieta Kim MA Financial Resource Worker   12/12/23     Phone: [phone]                     My Care Plans  Self Management and Treatment Plan    Care Plan  Care Plan: Financial Wellbeing       Problem: Patient expresses financial resource strain       Goal: Create an action plan to increase financial stability       Start Date: 12/18/2023    This Visit's Progress: 10%    Priority: High    Note:     Barriers: diagnoses of chronic, complex medical conditions; No insurance, [place] Islander speaking  Strengths: Motivated; agreeable to Care Coordination    Patient expressed understanding of goal: Yes    Action steps to achieve this goal:  1. I will work with the FRW to obtain insurance coverage and apply for lj care. If needed.    Until coverage is obtained you could take advantage of sliding scale clinics to reduce medical costs.    [facility] ([facility]) [phone]   -Connects uninsured/underinsured to medical, dental, or mental health in the [facility] [phone]  [address] [place] [address]  Provides medical, dental and mental health care, legal services. Sliding scale fees for uninsured.  Website: [email or web address].[email or web address]/    [facility] [place]  Offers sliding scale clinic visits, urgent care, mental health services, eye care  [address] [place], [place] [address] [phone]  Website: [email or web address]/    [facility] [place] [facility]  provides Sliding scale Dental for uninsured People They have onsite translation services. [phone]  Website:  [email or web address]://[facility]/      2. I will contact Care Coordinator for additional resources if resources provided do not work for my lifestyle/situation.   3. I will contact my care team with questions, concerns or support needs. I will use the clinic as a resource and I understand I can contact my clinic with 24/7 after hours services available. Care Coordinator will remain available as needed.                               Action Plans on File:                       Advance Care Plans/Directives:   Advanced Care Plan/Directives on file:   No    Discussed with patient/caregiver(s):   Other (Comment) (Deferred)             My Medical and Care Information  Problem List   Patient Active Problem List   Diagnosis    Hyperlipidemia LDL goal <100    Type 2 diabetes mellitus with stage 1 chronic kidney disease, without long-term current use of insulin (H)    Plantar warts    Tension headache    Microalbuminuria    Bell's palsy    Fatty liver disease, nonalcoholic    Benign essential hypertension      Current Medications and Allergies:  See printed Medication Report.    Care Coordination Start Date: 12/8/2023   Frequency of Care Coordination: monthly, more frequently as needed     Form Last Updated: 12/19/2023

## 2023-12-18 NOTE — LETTER
[facility] CARE COORDINATION  [address]  [facility] [address]    December 18, 2023    Lars Rochadal  [address] [facility] [address]      Dear Lars,    I am a clinic care coordinator who works with Mago Pantoja MD with the [facility]. I wanted to thank you for spending the time to talk with me.  Below is a description of clinic care coordination and how I can further assist you.       The clinic care coordination team is made up of a registered nurse, , financial resource worker and community health worker who understand the health care system. The goal of clinic care coordination is to help you manage your health and improve access to the health care system. Our team works alongside your provider to assist you in determining your health and social needs. We can help you obtain health care and community resources, providing you with necessary information and education. We can work with you through any barriers and develop a care plan that helps coordinate and strengthen the communication between you and your care team.  Our services are voluntary and are offered without charge to you personally.    Here are some sliding scale clinics you could try to reduce medical/dental costs until you are able to obtain insurance for You and Your Wife.     [facility] ([facility]) [phone]   -Connects uninsured/underinsured to medical, dental, or mental health in the [facility] [phone]  [address] [place] [address]  Provides medical, dental and mental health care, legal services. Sliding scale fees for uninsured.  Website: [email or web address].edu/    [facility] [place]  Offers sliding scale clinic visits, urgent care, mental health services, eye care  [address] [place], [place] [address] [phone]  Website:  [email or web address]/        [facility] in [facility] provides Sliding scale Dental for uninsured People They have onsite translation services. [phone]  Website:  [email or web address]/    Please feel free to contact me with any questions or concerns regarding care coordination and what we can offer.      We are focused on providing you with the highest-quality healthcare experience possible.    Sincerely,     ANURADHA SolorzanoN, RN, PHN   Care Coordinator-Ambulatory Care Management  [facility]  [phone]

## 2023-12-19 NOTE — PROGRESS NOTES
Clinic Care Coordination Contact  Clinic Care Coordination Contact  OUTREACH    Referral Information:  Referral Source: PCP         Chief Complaint   Patient presents with    Clinic Care Coordination - Initial        Universal Utilization: 22.5 % Risk of Admission or ED Visit.     Clinic Utilization  No PCP office visit in Past Year: No  Utilization      No Show Count (past year)  0             ED Visits  0             Hospital Admissions  0                    Current as of: 12/18/2023  8:14 PM                Clinical Concerns:  Current Medical Concerns:  DM type 2, no insurance.     Current Behavioral Concerns: None    Education Provided to patient: Educated on Care Coordination-enrolled. Educated on roll of FRW he did receive the FRW VM but thought that was the same as the RNCC assessment. He will call the FRW to start the process to obtain insurance and or lj as able. Him and his wife are not insured the children are. Educated on sliding scale clinic options for medical and dental-sent via [employer]. Patient reports his medications are affordable through [employer], declines any additional resources for prescription coverage at this time.       Health Maintenance Reviewed:    Health Maintenance Due   Topic Date Due    ADVANCE CARE PLANNING  Never done    EYE EXAM  Never done    COLORECTAL CANCER SCREENING  Never done    HEPATITIS B IMMUNIZATION (3 of 3 - 19+ 3-dose series) 09/30/2004    Pneumococcal Vaccine: Pediatrics (0 to 5 Years) and At-Risk Patients (6 to 64 Years) (2 - PCV) 01/09/2016    YEARLY PREVENTIVE VISIT  01/16/2019    DIABETIC FOOT EXAM  01/16/2019     Medication Management:  Medication review status: Medications reviewed and no changes reported per patient.          Current Outpatient Medications:     atorvastatin (LIPITOR) 20 MG tablet, Take 0.5 tablets (10 mg) by mouth daily, Disp: 45 tablet, Rfl: 3    gabapentin (NEURONTIN) 300 MG capsule, Take 1 capsule (300 mg) by mouth 3 times daily (Start  with 300mg on first day, then 300mg twice daily on second day, then 300mg three times daily), Disp: 90 capsule, Rfl: 1    glipiZIDE (GLUCOTROL XL) 10 MG 24 hr tablet, Take 1 tablet (10 mg) by mouth daily, Disp: 90 tablet, Rfl: 1    lisinopril (ZESTRIL) 10 MG tablet, Take 1 tablet (10 mg) by mouth daily, Disp: 90 tablet, Rfl: 1    metFORMIN (GLUCOPHAGE XR) 500 MG 24 hr tablet, Take 2 tablets (1,000 mg) by mouth 2 times daily, Disp: 360 tablet, Rfl: 1    traMADol (ULTRAM) 50 MG tablet, Take 1 tablet (50 mg) by mouth every 6 hours as needed for severe pain, Disp: 30 tablet, Rfl: 1     Allergies   Allergen Reactions    Nkda [No Known Drug Allergy]          Functional Status:  Dependent ADLs:: Independent  Dependent IADLs:: Independent  Bed or wheelchair confined:: No  Mobility Status: Independent    Living Situation:  Current living arrangement:: I live in a private home with family (2 adult daughters and a 6 yo son, spouse)  Type of residence:: Private home - no stairs    Lifestyle & Psychosocial Needs:    Social Determinants of Health     Food Insecurity: Low Risk  (12/8/2023)    Food Insecurity     Within the past 12 months, did you worry that your food would run out before you got money to buy more?: No     Within the past 12 months, did the food you bought just not last and you didn t have money to get more?: No   Depression: Not at risk (12/8/2023)    PHQ-2     PHQ-2 Score: 0   Housing Stability: Low Risk  (12/8/2023)    Housing Stability     Do you have housing? : Yes     Are you worried about losing your housing?: No   Tobacco Use: Low Risk  (12/8/2023)    Patient History     Smoking Tobacco Use: Never     Smokeless Tobacco Use: Never     Passive Exposure: Not on file   Financial Resource Strain: Low Risk  (12/8/2023)    Financial Resource Strain     Within the past 12 months, have you or your family members you live with been unable to get utilities (heat, electricity) when it was really needed?: No   Alcohol  Use: Not on file   Transportation Needs: Low Risk  (12/8/2023)    Transportation Needs     Within the past 12 months, has lack of transportation kept you from medical appointments, getting your medicines, non-medical meetings or appointments, work, or from getting things that you need?: No   Physical Activity: Not on file   Interpersonal Safety: Low Risk  (12/8/2023)    Interpersonal Safety     Do you feel physically and emotionally safe where you currently live?: Yes     Within the past 12 months, have you been hit, slapped, kicked or otherwise physically hurt by someone?: No     Within the past 12 months, have you been humiliated or emotionally abused in other ways by your partner or ex-partner?: No   Stress: Not on file   Social Connections: Not on file        Transportation means:: Regular car     Yazidism or spiritual beliefs that impact treatment:: No  Chemical Dependency Status: No Current Concerns  Informal Support system:: Spouse, Family      Resources and Interventions:  Current Resources:      Community Resources: None  Supplies Currently Used at Home: Diabetic Supplies (glucometer)  Equipment Currently Used at Home: none  Employment Status: seasonal worker (Depending on weather)         Advance Care Plan/Directive  Advanced Care Plans/Directives on file:: No  Discussed with patient/caregiver:: Other (Comment) (Deferred)    Referrals Placed: Financial Services    Care Plan:  Care Plan: Financial Wellbeing       Problem: Patient expresses financial resource strain       Goal: Create an action plan to increase financial stability       Start Date: 12/18/2023    This Visit's Progress: 10%    Priority: High    Note:     Barriers: diagnoses of chronic, complex medical conditions; No insurance, Moroccan speaking  Strengths: Motivated; agreeable to Care Coordination    Patient expressed understanding of goal: Yes    Action steps to achieve this goal:  1. I will work with the FRW to obtain insurance coverage and  apply for [facility] care. If needed.    Until coverage is obtained you could take advantage of sliding scale clinics to reduce medical costs.    [facility] ([facility]) [phone]   -Connects uninsured/underinsured to medical, dental, or mental health in the [facility] [phone]  [address] [place] [address]  Provides medical, dental and mental health care, legal services. Sliding scale fees for uninsured.  Website: https://[facility]/    [facility]  Offers sliding scale clinic visits, urgent care, mental health services, eye care  [address] [place] [address] [phone]  Website: [email or web address]/    [facility] in [facility] provides Sliding scale Dental for uninsured People They have onsite translation services. [phone]  Website:  [email or web address]      2. I will contact Care Coordinator for additional resources if resources provided do not work for my lifestyle/situation.   3. I will contact my care team with questions, concerns or support needs. I will use the clinic as a resource and I understand I can contact my clinic with 24/7 after hours services available. Care Coordinator will remain available as needed.                               Patient/Caregiver understanding: Patient/caregiver verbalized understanding and denies any additional questions or concerns at this time. RNCC engaged in [employer] communications during encounter.       Outreach Frequency: monthly, more frequently as needed  Future Appointments                In 3 days [facility] FINANCIAL COUNSELOR [facility] Insurance Verification, [facility]    In 1 month Gerard Irizarry OD M [facility]            Plan: Patient/caregiver will call RNCC with questions, concerns, support needs. RNCC will be available as needed.        ANURADHA SolorzanoN, RN, PHN   Care Coordinator-Ambulatory Care Management  [facility]  [phone]

## 2023-12-21 ENCOUNTER — PATIENT OUTREACH (OUTPATIENT)
Dept: CARE COORDINATION | Facility: CLINIC | Age: 48
End: 2023-12-21

## 2023-12-21 NOTE — PROGRESS NOTES
Clinic Care Coordination Contact  Program: [facility] CARE /INSURANCE   County: [facility] Case #:  [facility] Worker:   Mnsure #:   Subscriber #:   Renewal:  Date Applied:     [facility] Outreach:   23 FRW called patient and left a final vm with call back information as attempt x2 with no answer or return phone calls. FRW closed the FRW program and remove patient from panel. Patient can be referred back to FRW if needed.      Julieta Kim   Financial Resource Worker  [facility] Care Coordination  [phone]   23:   Outreach attempted x 1. Left message on voicemail with call back information and requested return call.  Plan: FRW will call again within one week.    Ann Raphael  Care   [facility] Care Coordination  [phone]    [facility] Care Application Screenin. How many people in household?  5  2. Do you file taxes, who do you file with (need 1040)? Yes  3. What is your monthly income (need 2 recent paystubs)? $3000.00  4. Do you have a bank account, need recent statement?      Health Insurance:      Referral/Screening:  [facility] Screening      Row Name 23 1404       [facility] Benefits   Is patient requesting help applying for [facility] benefits? No       Insurance:   [place]? No       Is this an insurance renewal? No       Is this a new insurance application request? Yes       Have you recently applied for insurance? No       How many people in your household? 5       Do you file taxes? Yes       How many dependents do you claim? 1       What is the monthly gross income for the household (wages, social security, workers comp, and pension)? 3000       OTHER   Is this a [facility] care application? Yes

## 2024-01-04 ENCOUNTER — PATIENT OUTREACH (OUTPATIENT)
Dept: CARE COORDINATION | Facility: CLINIC | Age: 49
End: 2024-01-04

## 2024-01-04 ENCOUNTER — APPOINTMENT (OUTPATIENT)
Dept: INTERPRETER SERVICES | Facility: CLINIC | Age: 49
End: 2024-01-04

## 2024-01-04 NOTE — PROGRESS NOTES
Clinic Care Coordination Contact  Program: AVELINA CARE /INSURANCE   County: [facility] Case #:  [facility] Worker:   Georgetteure #:   Subscriber #:   Renewal:  Date Applied:     FRW Outreach:   23 FRW received message from the CTA that the patient called back asking for the FRW to call and  FRW called patient and left a vm with call back information. FRW will make outreach in one week.  Julieta Kim   Financial Resource Worker  [facility] Care Coordination  [phone]   23 FRW called patient and left a final vm with call back information as attempt x2 with no answer or return phone calls. FRW closed the FRW program and remove patient from panel. Patient can be referred back to FRW if needed.      Julieta Kim   Financial Resource Worker  M [facility] Care Coordination  [phone]   23:   Outreach attempted x 1. Left message on voicemail with call back information and requested return call.  Plan: FRW will call again within one week.    Ann Raphael  Care   M [facility] Care Coordination  [phone]    [facility] Care Application Screenin. How many people in household?  5  2. Do you file taxes, who do you file with (need 1040)? Yes  3. What is your monthly income (need 2 recent paystubs)? $3000.00  4. Do you have a bank account, need recent statement?      Health Insurance:      Referral/Screening:  FRW Screening      Row Name 23 1405       [facility] Benefits   Is patient requesting help applying for [facility] benefits? No       Insurance:   [place]? No       Is this an insurance renewal? No       Is this a new insurance application request? Yes       Have you recently applied for insurance? No       How many people in your household? 5       Do you file taxes? Yes       How many dependents do you claim? 1       What is the monthly gross income for the household (wages,  social security, workers comp, and pension)? 3000       OTHER   Is this a lj care application? Yes

## 2024-01-09 ENCOUNTER — PATIENT OUTREACH (OUTPATIENT)
Dept: CARE COORDINATION | Facility: CLINIC | Age: 49
End: 2024-01-09

## 2024-01-09 NOTE — PROGRESS NOTES
Clinic Care Coordination Contact  Program: AVELINA CARE /INSURANCE   County: [facility] Case #:  [facility] Worker:   Jj #:   Subscriber #:   Renewal:  Date Applied:     FRW Outreach:    FRW called patient and left a final vm with call back information as attempt x2 with no answer or return phone calls. FRW closed the FRW program and remove patient from panel. Patient can be referred back to FRW if needed.      Julieta Kim   Financial Resource Worker  M [facility] Care Coordination  [phone]    23 FRW received message from the CTA that the patient called back asking for the FRW to call and  FRW called patient and left a vm with call back information. FRW will make outreach in one week.  Julieta Kim   Financial Resource Worker  M [facility] Care Coordination  [phone]   23 FRW called patient and left a final vm with call back information as attempt x2 with no answer or return phone calls. FRW closed the FRW program and remove patient from panel. Patient can be referred back to FRW if needed.      Julieta Kim   Financial Resource Worker  M [facility] Care Coordination  [phone]   23:   Outreach attempted x 1. Left message on voicemail with call back information and requested return call.  Plan: FRW will call again within one week.    Ann Raphael  Care   [facility] Care Coordination  [phone]    [facility] Care Application Screenin. How many people in household?  5  2. Do you file taxes, who do you file with (need 1040)? Yes  3. What is your monthly income (need 2 recent paystubs)? $3000.00  4. Do you have a bank account, need recent statement?      Health Insurance:      Referral/Screening:  FRW Screening      Row Name 23 [id number]       [facility] Benefits   Is patient requesting help applying for [facility] benefits? No       Insurance:   [place] insurance? No       Is this an insurance renewal? No       Is this a new insurance application request? Yes       Have you recently applied for insurance? No       How many people in your household? 5       Do you file taxes? Yes       How many dependents do you claim? 1       What is the monthly gross income for the household (wages, social security, workers comp, and pension)? 3000       OTHER   Is this a [facility] care application? Yes

## 2024-01-16 ENCOUNTER — TELEPHONE (OUTPATIENT)
Dept: GASTROENTEROLOGY | Facility: CLINIC | Age: 49
End: 2024-01-16

## 2024-01-16 ENCOUNTER — HOSPITAL ENCOUNTER (OUTPATIENT)
Facility: CLINIC | Age: 49
End: 2024-01-16
Attending: COLON & RECTAL SURGERY | Admitting: COLON & RECTAL SURGERY

## 2024-01-16 DIAGNOSIS — Z12.11 SPECIAL SCREENING FOR MALIGNANT NEOPLASMS, COLON: Primary | ICD-10-CM

## 2024-01-16 NOTE — TELEPHONE ENCOUNTER
"Endoscopy Scheduling Screen    Have you had a positive Covid test in the last 14 days?  No    Are you active on MyChart?   Yes    What insurance is in the chart?  Other:  NONE  TOLD ABOUT COST OF CARE/NO SURPRISE ACT    Ordering/Referring Provider:     STEPHY URIBE      (If ordering provider performs procedure, schedule with ordering provider unless otherwise instructed. )    BMI: Estimated body mass index is 26.33 kg/m  as calculated from the following:    Height as of 12/8/23: 1.63 m (5' 4.17\").    Weight as of 12/8/23: 69.9 kg (154 lb 3.2 oz).     Sedation Ordered  moderate sedation.   If patient BMI > 50 do not schedule in ASC.    If patient BMI > 45 do not schedule at [facility].    Are you taking methadone or Suboxone?  No    Are you taking any prescription medications for pain 3 or more times per week?   NO - No RN review required.    Do you have a history of malignant hyperthermia or adverse reaction to anesthesia?  No    (Females) Are you currently pregnant?        Have you been diagnosed or told you have pulmonary hypertension?   No    Do you have an LVAD?  No    Have you been told you have moderate to severe sleep apnea?  No    Have you been told you have COPD, asthma, or any other lung disease?  No    Do you have any heart conditions?  No     Have you ever had an organ transplant?   No    Have you ever had or are you awaiting a heart or lung transplant?   No    Have you had a stroke or transient ischemic attack (TIA aka \"mini stroke\" in the last 6 months?   No    Have you been diagnosed with or been told you have cirrhosis of the liver?   No    Are you currently on dialysis?   No    Do you need assistance transferring?   No    BMI: Estimated body mass index is 26.33 kg/m  as calculated from the following:    Height as of 12/8/23: 1.63 m (5' 4.17\").    Weight as of 12/8/23: 69.9 kg (154 lb 3.2 oz).     Is patients BMI > 40 and scheduling location [facility]?  No    Do you take an injectable medication for " weight loss or diabetes (excluding insulin)?  No    Do you take the medication Naltrexone?  No    Do you take blood thinners?  No       Prep   Are you currently on dialysis or do you have chronic kidney disease?  No    Do you have a diagnosis of diabetes?  Yes (Golytely Prep)    Do you have a diagnosis of cystic fibrosis (CF)?  No    On a regular basis do you go 3 -5 days between bowel movements?  No    BMI > 40?  No    Preferred Pharmacy:    [facility] [address] [place], [place] - [address]  [address]  [facility] [place] [address]  Phone: [phone] Fax: [phone]      Final Scheduling Details   Colonoscopy prep sent?  Standard Golytely    Procedure scheduled  Colonoscopy    Surgeon:  DG     Date of procedure:  4/1     Pre-OP / PAC:   No - Not required for this site.    Location  SH - Per order.    Sedation   Moderate Sedation - Per order.          Patient Reminders:   You will receive a call from a Nurse to review instructions and health history.  This assessment must be completed prior to your procedure.  Failure to complete the Nurse assessment may result in the procedure being cancelled.      On the day of your procedure, please designate an adult(s) who can drive you home stay with you for the next 24 hours. The medicines used in the exam will make you sleepy. You will not be able to drive.      You cannot take public transportation, ride share services, or non-medical taxi service without a responsible caregiver.  Medical transport services are allowed with the requirement that a responsible caregiver will receive you at your destination.  We require that drivers and caregivers are confirmed prior to your procedure.

## 2024-01-19 ENCOUNTER — PATIENT OUTREACH (OUTPATIENT)
Dept: CARE COORDINATION | Facility: CLINIC | Age: 49
End: 2024-01-19

## 2024-01-19 NOTE — PROGRESS NOTES
Clinic Care Coordination Contact  [facility]/Voicemail    Clinical Data: Care Coordinator Outreach    Outreach Documentation Number of Outreach Attempt   1/19/2024   3:44 PM 1       Left message on patient's voicemail via  #[phone], Jacklyn, with call back information and requested return call.    Plan: Care Coordinator will send unable to contact letter with care coordinator contact information via Planet[employer]an. Care Coordinator will try to reach patient again in 10 business days.    Lelia Bravo  Community Health Worker  [facility]  [phone]

## 2024-01-31 ENCOUNTER — PATIENT OUTREACH (OUTPATIENT)
Dept: CARE COORDINATION | Facility: CLINIC | Age: 49
End: 2024-01-31

## 2024-01-31 ENCOUNTER — TELEPHONE (OUTPATIENT)
Dept: OPHTHALMOLOGY | Facility: CLINIC | Age: 49
End: 2024-01-31

## 2024-01-31 NOTE — PROGRESS NOTES
Clinic Care Coordination Contact  Care Coordination Clinician Chart Review    Situation: Patient chart reviewed by Care Coordinator.       Background: Care Coordination Program started: 12/8/2023. Initial assessment completed and patient-centered care plan(s) were developed with participation from patient. Lead CC handed patient off to CHW for continued outreaches.       Assessment: Per chart review, patient outreach completed by CC CHW on 1/19/24 CHW will attempt to reach Patient again. FRW was [facility] and closed referral. If Patient is still interested in working with FRW new order will need to be placed.  Patient is actively working to accomplish goal(s). Patient's goal(s) appropriate and relevant at this time. Patient is not due for updated Plan of Care.  Assessments will be completed annually or as needed/with change of patient status.      Care Plan: Financial Wellbeing       Problem: Patient expresses financial resource strain       Goal: Create an action plan to increase financial stability       Start Date: 12/18/2023    This Visit's Progress: 10%    Priority: High    Note:     Barriers: diagnoses of chronic, complex medical conditions; No insurance, Chilean speaking  Strengths: Motivated; agreeable to Care Coordination    Patient expressed understanding of goal: Yes    Action steps to achieve this goal:  1. I will work with the FRW to obtain insurance coverage and apply for lj care. If needed.    Until coverage is obtained you could take advantage of sliding scale clinics to reduce medical costs.-Sent via [facility] ([facility]) [phone]   -Connects uninsured/underinsured to medical, dental, or mental health in the [facility] [phone]  [address] [place] [address]  Provides medical, dental and mental health care, legal services. Sliding scale fees for uninsured.  Website:  [email or web address]    [facility]  Offers sliding scale clinic visits, urgent care, mental health services, eye care  [address] [place] [address]  [phone]  Website: [email or web address]    Action care clinic in [facility] provides Sliding scale Dental for uninsured People They have onsite translation services. [phone]  Website:  [email or web address]://[employer].BeVocal/      2. I will contact Care Coordinator for additional resources if resources provided do not work for my lifestyle/situation.   3. I will contact my care team with questions, concerns or support needs. I will use the clinic as a resource and I understand I can contact my clinic with 24/7 after hours services available. Care Coordinator will remain available as needed.                                  Plan/Recommendations: The patient will continue working with Care Coordination to achieve goal(s) as above. CHW will continue outreaches at minimum every 30 days and will involve Lead CC as needed or if patient is ready to move to Maintenance. Lead CC will continue to monitor CHW outreaches and patient's progress to goal(s) every 6 weeks.     Plan of Care updated and sent to patient: JENI Hernandez, RN, PHN   Care Coordinator-Ambulatory Care Management  [facility]  [phone]

## 2024-02-20 ENCOUNTER — PATIENT OUTREACH (OUTPATIENT)
Dept: CARE COORDINATION | Facility: CLINIC | Age: 49
End: 2024-02-20

## 2024-02-20 NOTE — LETTER
[facility] CARE COORDINATION  [address]  [facility] [address]    February 20, 2024    Lars Daley  [address] [facility] [address]      Dear Lars,    I have been attempting to reach you since our last contact. I would like to continue to work with you and provide any additional support you may need on achieving your health care related goals. I would appreciate if you would give me a call at [phone] to let me know if you would like to continue working together. I know that there are many things that can affect our ability to communicate and I hope we can continue to work together.    All of us at the [facility] are invested in your health and are here to assist you in meeting your goals.     Sincerely,  ANURADHA SolorzanoN, RN, PHN   Care Coordinator-Ambulatory Care Management  [facility]  [phone]

## 2024-02-20 NOTE — PROGRESS NOTES
Clinic Care Coordination Contact    Situation: Patient chart reviewed by care coordinator.    Background: Patient enrolled in Care Coordination 12/18/23    Assessment: CHW attempted x2 to reach Patient unsuccessfully.     Plan/Recommendations: Will send unable to contact letter to Patient and disenroll with Care Coordination. If new needs were to arise please make a new referral.     ANURADHA SolorzanoN, RN, PHN   Care Coordinator-Ambulatory Care Management  [facility]  [phone]

## 2024-02-20 NOTE — PROGRESS NOTES
Clinic Care Coordination Contact  [facility]/Voicemail    Clinical Data: Care Coordinator Outreach    Outreach Documentation Number of Outreach Attempt   1/19/2024   3:44 PM 1   2/20/2024  10:36 AM 2       Left message on patient's voicemail with call back information and requested return call.    Plan: CHW will route to DAVID Hill, to review for disenrollment and/or further direction per standard work. No outreaches scheduled at this time.      Lelia Bravo  Community Health Worker  [facility]  [phone]

## 2024-03-14 RX ORDER — BISACODYL 5 MG/1
TABLET, DELAYED RELEASE ORAL
Qty: 4 TABLET | Refills: 0 | Status: ON HOLD | OUTPATIENT
Start: 2024-03-14 | End: 2024-06-13

## 2024-03-14 NOTE — TELEPHONE ENCOUNTER
Standard Golytely Bowel Prep. Instructions were sent via [employer]. Bowel prep was sent 3/14/2024 to      [facility] [place] [place] - [address] Colorectal Cancer    Division of Gastroenterology at [facility]

## 2024-03-16 ENCOUNTER — TELEPHONE (OUTPATIENT)
Dept: GASTROENTEROLOGY | Facility: CLINIC | Age: 49
End: 2024-03-16

## 2024-03-17 NOTE — TELEPHONE ENCOUNTER
Pre visit planning completed.      Procedure details:    Patient scheduled for Colonoscopy  on 4/1/24.     Arrival time: 1045. Procedure time 1130    Facility location: [facility]; [facility] [place] [address]. Check in location: [facility].     Sedation type: Conscious sedation     Pre op exam needed? N/A    Indication for procedure: Screening      Chart review:     Electronic implanted devices? No    Recent diagnosis of diverticulitis within the last 6 weeks? No    Diabetic? Yes.   Diabetic medication HOLDING recommendations:   Oral diabetic medications: Yes:    Glipizide (glucotrol): HOLD day of procedure.  Metformin (glucophage): HOLD day of procedure.   Diabetic injectables: No   Insulin: No      Medication review:    Anticoagulants? No    NSAIDS? No NSAID medications per patient's medication list.  RN will verify with pre-assessment call.    Other medication HOLDING recommendations:  N/A      Prep for procedure:     Bowel prep recommendation: Standard Golytely.   Bowel prep prescription sent to    [facility] [address] [place], [place] [address]  by CRC nursing team on 3/14/24.  Due to: diabetes.     Prep instructions sent via connie Jean Baptiste RN  Endoscopy Procedure Pre Assessment RN  [phone] option 4

## 2024-03-18 NOTE — TELEPHONE ENCOUNTER
Pre assessment completed for upcoming procedure.   (Please see previous telephone encounter notes for complete details)      Procedure details:    Arrival time and facility location reviewed.    Pre op exam needed? N/A    Designated  policy reviewed. Instructed to have someone stay 6  hours post procedure.       Medication review:    Medications reviewed. Please see supporting documentation below. Holding recommendations discussed (if applicable).       Prep for procedure:     Procedure prep instructions reviewed.        Any additional information needed:  N/A      Patient  verbalized understanding and had no questions or concerns at this time.      Halie Monzon RN  Endoscopy Procedure Pre Assessment RN  [phone] option 4

## 2024-03-19 ENCOUNTER — TELEPHONE (OUTPATIENT)
Dept: GASTROENTEROLOGY | Facility: CLINIC | Age: 49
End: 2024-03-19

## 2024-03-20 NOTE — TELEPHONE ENCOUNTER
Patient sent my chart message back saying they will confirm with insurance and reschedule at another time.

## 2024-04-21 ENCOUNTER — HEALTH MAINTENANCE LETTER (OUTPATIENT)
Age: 49
End: 2024-04-21

## 2024-06-03 NOTE — TELEPHONE ENCOUNTER
Rescheduled Colonoscopy     Pre visit planning completed.      Procedure details:    Patient scheduled for Colonoscopy  on 6/13/2024.     Arrival time: 0915. Procedure time 1000    Facility location: [facility]; [facility] [place] [address]. Check in location: [facility].     Sedation type: Conscious sedation     Pre op exam needed? N/A    Indication for procedure: Screening         Chart review:     Electronic implanted devices? No    Recent diagnosis of diverticulitis within the last 6 weeks? No    Diabetic? Yes. Diabetic medication HOLDING recommendations: Oral diabetic medications: Yes:  Glipizide (glucotrol): HOLD day of procedure.  Metformin (glucophage): HOLD day of procedure.       Medication review:    Anticoagulants? No    NSAIDS? No NSAID medications per patient's medication list.  RN will verify with pre-assessment call.    Other medication HOLDING recommendations:  N/A      Prep for procedure:     Bowel prep recommendation: Standard Golytely. Bowel prep prescription sent  [facility] [address] [place] [address] E by CRC nursing team on 3/14/24. Discuss with patient if they need it resent.   Due to: diabetes.     Prep instructions sent via [employer] Y[employer]         Jackie Hanks RN  Endoscopy Procedure Pre Assessment RN  [phone] option 4

## 2024-06-04 NOTE — TELEPHONE ENCOUNTER
Utilized  ID#: [id number] to place calls.     Attempted to contact patient in order to complete pre assessment questions.     No answer at home or mobile number. Left message to return call to [phone] option 4.    Callback required communication sent via WorldEscape.      Georgette Jean Baptiste RN  Endoscopy Procedure Pre Assessment

## 2024-06-05 NOTE — TELEPHONE ENCOUNTER
Upon further review of chart/snapboard, patient is part of the free screening colonoscopy program and was scheduled by  nursing staff -  site should be taking care of prep and pre assessment call. Please do NOT cancel 6/13/24 colonoscopy procedure if patient does not complete the pre assessment call.    Georgette Jean Baptiste RN  Endoscopy Procedure Pre-Assessment RN  [phone], option 4

## 2024-06-13 ENCOUNTER — TRANSFERRED RECORDS (OUTPATIENT)
Dept: MULTI SPECIALTY CLINIC | Facility: CLINIC | Age: 49
End: 2024-06-13

## 2024-06-13 ENCOUNTER — HOSPITAL ENCOUNTER (OUTPATIENT)
Facility: CLINIC | Age: 49
Discharge: HOME | End: 2024-06-13
Attending: COLON & RECTAL SURGERY | Admitting: COLON & RECTAL SURGERY

## 2024-06-13 VITALS
BODY MASS INDEX: 25.66 KG/M2 | OXYGEN SATURATION: 95 % | HEIGHT: 65 IN | WEIGHT: 154 LBS | RESPIRATION RATE: 10 BRPM | SYSTOLIC BLOOD PRESSURE: 124 MMHG | DIASTOLIC BLOOD PRESSURE: 81 MMHG | HEART RATE: 70 BPM

## 2024-06-13 LAB
COLONOSCOPY: NORMAL
RETINOPATHY: NORMAL

## 2024-06-13 PROCEDURE — 45385 COLONOSCOPY W/LESION REMOVAL: CPT | Performed by: COLON & RECTAL SURGERY

## 2024-06-13 PROCEDURE — 88305 TISSUE EXAM BY PATHOLOGIST: CPT | Mod: 26 | Performed by: PATHOLOGY

## 2024-06-13 PROCEDURE — 88305 TISSUE EXAM BY PATHOLOGIST: CPT | Mod: TC | Performed by: COLON & RECTAL SURGERY

## 2024-06-13 PROCEDURE — 250N000011 HC RX IP 250 OP 636: Mod: JZ | Performed by: COLON & RECTAL SURGERY

## 2024-06-13 PROCEDURE — G0500 MOD SEDAT ENDO SERVICE >5YRS: HCPCS | Mod: PT | Performed by: COLON & RECTAL SURGERY

## 2024-06-13 PROCEDURE — 258N000003 HC RX IP 258 OP 636: Mod: JZ | Performed by: COLON & RECTAL SURGERY

## 2024-06-13 RX ORDER — SODIUM CHLORIDE 9 MG/ML
INJECTION, SOLUTION INTRAVENOUS CONTINUOUS PRN
Status: DISCONTINUED | OUTPATIENT
Start: 2024-06-13 | End: 2024-06-13 | Stop reason: HOSPADM

## 2024-06-13 RX ORDER — ONDANSETRON 2 MG/ML
4 INJECTION INTRAMUSCULAR; INTRAVENOUS
Status: DISCONTINUED | OUTPATIENT
Start: 2024-06-13 | End: 2024-06-13 | Stop reason: HOSPADM

## 2024-06-13 RX ORDER — FENTANYL CITRATE 50 UG/ML
INJECTION, SOLUTION INTRAMUSCULAR; INTRAVENOUS PRN
Status: DISCONTINUED | OUTPATIENT
Start: 2024-06-13 | End: 2024-06-13 | Stop reason: HOSPADM

## 2024-06-13 RX ORDER — LIDOCAINE 40 MG/G
CREAM TOPICAL
Status: DISCONTINUED | OUTPATIENT
Start: 2024-06-13 | End: 2024-06-13 | Stop reason: HOSPADM

## 2024-06-13 ASSESSMENT — ACTIVITIES OF DAILY LIVING (ADL): ADLS_ACUITY_SCORE: 35

## 2024-06-13 NOTE — H&P
Colon & Rectal Surgery History and Physical  Pre-Endoscopy Procedure Note    History of Present Illness   I have been asked by Dr. Pantoja to evaluate this 49 year old male for colorectal cancer screening. He currently denies any abdominal pain, weight loss, bleeding per rectum, or recent change in bowel habits.    Past Medical History  Diagnosis Date    Depression, major, single episode, moderate  01/01/2008    Hyperlipidemia LDL     simvastatin    Hypertension     Type 2 diabetes 01/01/2008       Past Surgical History  Procedure Laterality Date    STRESS ECHO (METRO)  1/30/11    normal        Medications     Medications Prior to Admission   Medication Sig    atorvastatin (LIPITOR) 20 MG tablet Take 0.5 tablets (10 mg) by mouth daily    gabapentin (NEURONTIN) 300 MG capsule Take 1 capsule (300 mg) by mouth 3 times daily (Start with 300mg on first day, then 300mg twice daily on second day, then 300mg three times daily)    glipiZIDE (GLUCOTROL XL) 10 MG 24 hr tablet Take 1 tablet (10 mg) by mouth daily    lisinopril (ZESTRIL) 10 MG tablet Take 1 tablet (10 mg) by mouth daily    metFORMIN (GLUCOPHAGE XR) 500 MG 24 hr tablet Take 2 tablets (1,000 mg) by mouth 2 times daily    traMADol (ULTRAM) 50 MG tablet Take 1 tablet (50 mg) by mouth every 6 hours as needed for severe pain       Allergies  Allergen Reactions    NKDA [No Known Drug Allergy]         Family History   Family history includes C.A.D. (age of onset: 49) in his maternal uncle; Coronary Artery Disease (age of onset: 59) in his mother; Diabetes (age of onset: 40) in his mother; Heart Disease (age of onset: 43) in his maternal uncle.     Social History   He reports that he has never smoked. He has never used smokeless tobacco. He reports current alcohol use. He reports that he does not use drugs.    Review of Systems   Constitutional:  No fever, weight change or fatigue.    Eyes:     No dry eyes or vision changes.   Ears/Nose/Throat/Neck:  No oral ulcers, sore  "throat or voice change.    Cardiovascular:   No palpitations, syncope, angina or edema.   Respiratory:    No chest pain, excessive sleepiness, shortness of breath or hemoptysis.    Gastrointestinal:   No abdominal pain, nausea, vomiting, diarrhea or heartburn.    Genitourinary:   No dysuria, hematuria, urinary retention or urinary frequency.   Musculoskeletal:  No joint swelling or arthralgias.    Dermatologic:  No skin rash or other skin changes.   Neurologic:    No focal weakness or numbness. No neuropathy.   Psychiatric:    No depression, anxiety, suicidal ideation, or paranoid ideation.   Endocrine:   No cold or heat intolerance, polydipsia, hirsutism, change in libido, or flushing.   Hematology/Lymphatic:  No bleeding or lymphadenopathy.    Allergy/Immunology:  No rhinitis or hives.     Physical Exam   Vitals:  Blood pressure 137/105, pulse 77, resp. rate 16, height 1.638 m (5' 4.5\"), weight 69.9 kg (154 lb), SpO2 98%.    General:  Alert and oriented to person, place and time   Airway: Normal oropharyngeal airway and neck mobility   Lungs:  Clear bilaterally   Heart:  Regular rate and rhythm   Abdomen: Soft, NT, ND, no masses   Extremities: Warm, good capillary refill    ASA Grade: II (mild systemic disease)    Impression: Cleared for use of conscious sedation for colorectal cancer screening    Plan: Proceed with colonoscopy     Gema Moreno MD  [facility]  [phone]  "

## 2024-06-14 LAB
PATH REPORT.COMMENTS IMP SPEC: NORMAL
PATH REPORT.COMMENTS IMP SPEC: NORMAL
PATH REPORT.FINAL DX SPEC: NORMAL
PATH REPORT.GROSS SPEC: NORMAL
PATH REPORT.MICROSCOPIC SPEC OTHER STN: NORMAL
PATH REPORT.RELEVANT HX SPEC: NORMAL
PHOTO IMAGE: NORMAL

## 2024-06-30 ENCOUNTER — HEALTH MAINTENANCE LETTER (OUTPATIENT)
Age: 49
End: 2024-06-30

## 2024-09-08 ENCOUNTER — HEALTH MAINTENANCE LETTER (OUTPATIENT)
Age: 49
End: 2024-09-08

## 2025-01-05 ENCOUNTER — HEALTH MAINTENANCE LETTER (OUTPATIENT)
Age: 50
End: 2025-01-05

## 2025-03-03 DIAGNOSIS — E11.22 TYPE 2 DIABETES MELLITUS WITH STAGE 1 CHRONIC KIDNEY DISEASE, WITHOUT LONG-TERM CURRENT USE OF INSULIN (H): ICD-10-CM

## 2025-03-03 DIAGNOSIS — R80.9 MICROALBUMINURIA: ICD-10-CM

## 2025-03-03 DIAGNOSIS — E11.9 TYPE 2 DIABETES MELLITUS WITHOUT COMPLICATION, WITHOUT LONG-TERM CURRENT USE OF INSULIN (H): ICD-10-CM

## 2025-03-03 DIAGNOSIS — N18.1 TYPE 2 DIABETES MELLITUS WITH STAGE 1 CHRONIC KIDNEY DISEASE, WITHOUT LONG-TERM CURRENT USE OF INSULIN (H): ICD-10-CM

## 2025-03-03 NOTE — TELEPHONE ENCOUNTER
Clinic RN: Please investigate patient's chart or contact patient if the information cannot be found because patient should have run out of this medication on 06/2024. Confirm patient is taking this medication as prescribed. Document findings and route refill encounter to provider for approval or denial.  Thanks,  Brittanie BERGER RN

## 2025-03-03 NOTE — TELEPHONE ENCOUNTER
Left non detailed voicemail for patient asking that they callback.  When patient calls back please inform of need for visit for refills  Has not been seen by PCP since 12/2023    Bruna MATA RN

## 2025-03-04 RX ORDER — METFORMIN HYDROCHLORIDE 500 MG/1
1000 TABLET, EXTENDED RELEASE ORAL 2 TIMES DAILY
Qty: 360 TABLET | Refills: 0 | OUTPATIENT
Start: 2025-03-04

## 2025-03-04 RX ORDER — LISINOPRIL 10 MG/1
10 TABLET ORAL DAILY
Qty: 90 TABLET | Refills: 0 | OUTPATIENT
Start: 2025-03-04

## 2025-03-04 RX ORDER — GLIPIZIDE 10 MG/1
10 TABLET, FILM COATED, EXTENDED RELEASE ORAL DAILY
Qty: 90 TABLET | Refills: 0 | OUTPATIENT
Start: 2025-03-04

## 2025-03-04 NOTE — TELEPHONE ENCOUNTER
I had sent following Transaq message to patient:        3/3/25 12:21 PM  Hi Lars,      We received your request for refills of glipizide, lisinopril, and metformin. From reviewing your chart, we see that you have not been seen at [facility] since 12/08/2023.      To address this refill request and discuss medications, I would recommend to schedule an in-person or a virtual visit (done over video or telephone). You can find the option for scheduling a visit within the menu on Proximagen.      If you have any trouble finding the scheduling options on Proximagen, please give us a call at [phone] to assist with scheduling.      Dawn Vivar RN    Last read by Lars Daley at 12:27 PM on 3/3/2025.    Proxim[employer] message was read by patient yesterday. Patient had been advised to contact clinic for appt/refills.   Closing encounter    Dawn SALMON RN

## 2025-03-25 ENCOUNTER — OFFICE VISIT (OUTPATIENT)
Dept: INTERNAL MEDICINE | Facility: CLINIC | Age: 50
End: 2025-03-25

## 2025-03-25 VITALS
RESPIRATION RATE: 21 BRPM | TEMPERATURE: 97.7 F | OXYGEN SATURATION: 99 % | HEIGHT: 65 IN | HEART RATE: 69 BPM | SYSTOLIC BLOOD PRESSURE: 120 MMHG | DIASTOLIC BLOOD PRESSURE: 80 MMHG | WEIGHT: 166.5 LBS | BODY MASS INDEX: 27.74 KG/M2

## 2025-03-25 DIAGNOSIS — E78.5 HYPERLIPIDEMIA LDL GOAL <100: ICD-10-CM

## 2025-03-25 DIAGNOSIS — M26.609 TMJ (TEMPOROMANDIBULAR JOINT SYNDROME): ICD-10-CM

## 2025-03-25 DIAGNOSIS — E11.22 TYPE 2 DIABETES MELLITUS WITH STAGE 1 CHRONIC KIDNEY DISEASE, WITHOUT LONG-TERM CURRENT USE OF INSULIN (H): Primary | ICD-10-CM

## 2025-03-25 DIAGNOSIS — B02.29 POST HERPETIC NEURALGIA: ICD-10-CM

## 2025-03-25 DIAGNOSIS — E11.9 TYPE 2 DIABETES MELLITUS WITHOUT COMPLICATION, WITHOUT LONG-TERM CURRENT USE OF INSULIN (H): ICD-10-CM

## 2025-03-25 DIAGNOSIS — R80.9 MICROALBUMINURIA: ICD-10-CM

## 2025-03-25 DIAGNOSIS — N18.1 TYPE 2 DIABETES MELLITUS WITH STAGE 1 CHRONIC KIDNEY DISEASE, WITHOUT LONG-TERM CURRENT USE OF INSULIN (H): Primary | ICD-10-CM

## 2025-03-25 PROCEDURE — 99214 OFFICE O/P EST MOD 30 MIN: CPT | Performed by: PHYSICIAN ASSISTANT

## 2025-03-25 RX ORDER — GLIPIZIDE 10 MG/1
10 TABLET, FILM COATED, EXTENDED RELEASE ORAL DAILY
Qty: 90 TABLET | Refills: 0 | Status: SHIPPED | OUTPATIENT
Start: 2025-03-25

## 2025-03-25 RX ORDER — LISINOPRIL 10 MG/1
10 TABLET ORAL DAILY
Qty: 90 TABLET | Refills: 0 | Status: SHIPPED | OUTPATIENT
Start: 2025-03-25

## 2025-03-25 RX ORDER — METFORMIN HYDROCHLORIDE 500 MG/1
1000 TABLET, EXTENDED RELEASE ORAL 2 TIMES DAILY
Qty: 360 TABLET | Refills: 0 | Status: SHIPPED | OUTPATIENT
Start: 2025-03-25

## 2025-03-25 RX ORDER — NABUMETONE 500 MG/1
500 TABLET, FILM COATED ORAL 2 TIMES DAILY
Qty: 20 TABLET | Refills: 0 | Status: SHIPPED | OUTPATIENT
Start: 2025-03-25

## 2025-03-25 RX ORDER — ATORVASTATIN CALCIUM 20 MG/1
10 TABLET, FILM COATED ORAL DAILY
Qty: 45 TABLET | Refills: 0 | Status: SHIPPED | OUTPATIENT
Start: 2025-03-25

## 2025-03-25 RX ORDER — GABAPENTIN 300 MG/1
300 CAPSULE ORAL 3 TIMES DAILY
Qty: 90 CAPSULE | Refills: 1 | Status: CANCELLED | OUTPATIENT
Start: 2025-03-25

## 2025-03-25 NOTE — PROGRESS NOTES
"  Assessment & Plan     Type 2 diabetes mellitus with stage 1 chronic kidney disease, without long-term current use of insulin (H)    - lisinopril (ZESTRIL) 10 MG tablet; Take 1 tablet (10 mg) by mouth daily.  - glipiZIDE (GLUCOTROL XL) 10 MG 24 hr tablet; Take 1 tablet (10 mg) by mouth daily.    Type 2 diabetes mellitus without complication, without long-term current use of insulin (H)    - metFORMIN (GLUCOPHAGE XR) 500 MG 24 hr tablet; Take 2 tablets (1,000 mg) by mouth 2 times daily.    Microalbuminuria    - lisinopril (ZESTRIL) 10 MG tablet; Take 1 tablet (10 mg) by mouth daily.    Hyperlipidemia LDL goal <100    - atorvastatin (LIPITOR) 20 MG tablet; Take 0.5 tablets (10 mg) by mouth daily.    TMJ (temporomandibular joint syndrome)  Reviewed TMJ see AVS   Icing heat and short use of NSAID  - nabumetone (RELAFEN) 500 MG tablet; Take 1 tablet (500 mg) by mouth 2 times daily.    Short refill done on medication for patient  Has Video visit next week with current PCP - to keep needs labs ordered.   Plans to establish later with PCP at [facility] -info sheet given with PCP options       BMI  Estimated body mass index is 28.14 kg/m  as calculated from the following:    Height as of this encounter: 1.638 m (5' 4.5\").    Weight as of this encounter: 75.5 kg (166 lb 8 oz).             Kori Sousa is a 50 year old, presenting for the following health issues:  Musculoskeletal Problem    HPI      Jaw gets tired when chewing or talking a lot started about 2 weeks ago. Wondering if he can get a mansoor fill on his medications until he can establish care with a provider at [facility]. Pt is looking to establish care with one of our providers since it is closer to his house.     Musculoskeletal problem/pain    Duration: 2 weeks   Description  Location: bilateral   Intensity:  mild  Accompanying signs and symptoms: none  History  Previous similar problem: no   Previous evaluation:  none  Precipitating or alleviating factors:  Trauma " "or overuse: no   Aggravating factors include: chewing  Feels a little more stressed lately   Therapies tried and outcome: nothing                    Objective    /80 (BP Location: Left arm, Patient Position: Sitting, Cuff Size: Adult Regular)   Pulse 69   Temp 97.7  F (36.5  C) (Temporal)   Resp 21   Ht 1.638 m (5' 4.5\")   Wt 75.5 kg (166 lb 8 oz)   SpO2 99%   BMI 28.14 kg/m    Body mass index is 28.14 kg/m .  Physical Exam   GENERAL: alert and no distress  MS: tenderness TMJ joint and masseter bilaterally             Signed Electronically by: Shila Francis PA-C    "

## 2025-04-09 ENCOUNTER — VIRTUAL VISIT (OUTPATIENT)
Dept: FAMILY MEDICINE | Facility: CLINIC | Age: 50
End: 2025-04-09
Payer: MEDICAID

## 2025-04-09 DIAGNOSIS — E11.22 TYPE 2 DIABETES MELLITUS WITH STAGE 1 CHRONIC KIDNEY DISEASE, WITHOUT LONG-TERM CURRENT USE OF INSULIN (H): Primary | ICD-10-CM

## 2025-04-09 DIAGNOSIS — K76.0 FATTY LIVER DISEASE, NONALCOHOLIC: ICD-10-CM

## 2025-04-09 DIAGNOSIS — N18.1 TYPE 2 DIABETES MELLITUS WITH STAGE 1 CHRONIC KIDNEY DISEASE, WITHOUT LONG-TERM CURRENT USE OF INSULIN (H): Primary | ICD-10-CM

## 2025-04-09 DIAGNOSIS — E78.5 HYPERLIPIDEMIA LDL GOAL <100: ICD-10-CM

## 2025-04-09 DIAGNOSIS — I10 BENIGN ESSENTIAL HYPERTENSION: ICD-10-CM

## 2025-04-09 DIAGNOSIS — R51.9 PAIN OF CHEEK: ICD-10-CM

## 2025-04-09 DIAGNOSIS — E11.3393 MODERATE NONPROLIFERATIVE DIABETIC RETINOPATHY OF BOTH EYES WITHOUT MACULAR EDEMA ASSOCIATED WITH TYPE 2 DIABETES MELLITUS (H): ICD-10-CM

## 2025-04-09 PROCEDURE — 98006 SYNCH AUDIO-VIDEO EST MOD 30: CPT | Performed by: FAMILY MEDICINE

## 2025-04-09 ASSESSMENT — PATIENT HEALTH QUESTIONNAIRE - PHQ9
SUM OF ALL RESPONSES TO PHQ QUESTIONS 1-9: 0
10. IF YOU CHECKED OFF ANY PROBLEMS, HOW DIFFICULT HAVE THESE PROBLEMS MADE IT FOR YOU TO DO YOUR WORK, TAKE CARE OF THINGS AT HOME, OR GET ALONG WITH OTHER PEOPLE: NOT DIFFICULT AT ALL
SUM OF ALL RESPONSES TO PHQ QUESTIONS 1-9: 0

## 2025-04-09 ASSESSMENT — ANXIETY QUESTIONNAIRES
3. WORRYING TOO MUCH ABOUT DIFFERENT THINGS: NOT AT ALL
4. TROUBLE RELAXING: NOT AT ALL
GAD7 TOTAL SCORE: 0
5. BEING SO RESTLESS THAT IT IS HARD TO SIT STILL: NOT AT ALL
8. IF YOU CHECKED OFF ANY PROBLEMS, HOW DIFFICULT HAVE THESE MADE IT FOR YOU TO DO YOUR WORK, TAKE CARE OF THINGS AT HOME, OR GET ALONG WITH OTHER PEOPLE?: NOT DIFFICULT AT ALL
7. FEELING AFRAID AS IF SOMETHING AWFUL MIGHT HAPPEN: NOT AT ALL
IF YOU CHECKED OFF ANY PROBLEMS ON THIS QUESTIONNAIRE, HOW DIFFICULT HAVE THESE PROBLEMS MADE IT FOR YOU TO DO YOUR WORK, TAKE CARE OF THINGS AT HOME, OR GET ALONG WITH OTHER PEOPLE: NOT DIFFICULT AT ALL
GAD7 TOTAL SCORE: 0
6. BECOMING EASILY ANNOYED OR IRRITABLE: NOT AT ALL
7. FEELING AFRAID AS IF SOMETHING AWFUL MIGHT HAPPEN: NOT AT ALL
1. FEELING NERVOUS, ANXIOUS, OR ON EDGE: NOT AT ALL
2. NOT BEING ABLE TO STOP OR CONTROL WORRYING: NOT AT ALL
GAD7 TOTAL SCORE: 0

## 2025-04-09 NOTE — PROGRESS NOTES
Lars is a 50 year old who is being evaluated via a billable video visit.    What phone number would you like to be contacted at? [phone]  How would you like to obtain your AVS? [place]      Assessment & Plan     Type 2 diabetes mellitus with stage 1 chronic kidney disease, without long-term current use of insulin (H)  Finally on stable insurance and will now be able to do regular appointments and labs.  Last set of labs in 12/23.  Will plan to establish care at [facility] in [place], which is 3 blocks from his house.  He has almost 3 months of medications from his recent  appt, and states he has been able to stay on the meds consistently despite rare care the last couple years.  Will plan to have him set up an appt at [facility] to establish care in mid June, and ordered usual yearly fasting labs so that he can have these done about a week priro to his appointment there.  - Lipid panel reflex to direct LDL Fasting; Future  - Comprehensive metabolic panel (BMP + Alb, Alk Phos, ALT, AST, Total. 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    Hyperlipidemia LDL goal <100  Continues on atorvastatin 20mg/d, no se's, has refills to last until late June.  Last  in 12/23, will recheck fasting lipids prior to his 6/25 establish care appt.  - Lipid panel reflex to direct LDL Fasting; Future  - Comprehensive metabolic panel (BMP + Alb, Alk Phos, ALT, AST, Total. Bili, TP); Future    Benign essential hypertension  BP normal at recent  appt in 3/25.  Continues on lisinopril 10mg/d, no  "se's.  Has refills to last until late June.  Labs ordered as below.  - Comprehensive metabolic panel (BMP + Alb, Alk Phos, ALT, AST, Total. Bili, TP); Future  - Albumin Random Urine Quantitative with Creat Ratio; Future    Pain of cheek  Pt notes pain in bilateral cheek muscles chewing hard things like steak lately.  Will check labs as above and these others.  Continue to evaluate if sx's persist with office appt/exam.  - Vitamin B12; Future  - Vitamin D Deficiency; Future          BMI  Estimated body mass index is 28.14 kg/m  as calculated from the following:    Height as of 3/25/25: 1.638 m (5' 4.5\").    Weight as of 3/25/25: 75.5 kg (166 lb 8 oz).   Weight management plan: Discussed healthy diet and exercise guidelines            Kori Sousa is a 50 year old, presenting for the following health issues:  Diabetes        4/9/2025     1:28 PM   Additional Questions   Roomed by jas albarado   Accompanied by n/a         4/9/2025     1:28 PM   Patient Reported Additional Medications   Patient reports taking the following new medications n/a       Video Start Time: 1:30 PM    History of Present Illness       Diabetes:   He presents for follow up of diabetes.  He is checking home blood glucose a few times a month.   He checks blood glucose before meals.  Blood glucose is never over 200 and never under 70.  When his blood glucose is low, the patient is asymptomatic for confusion, blurred vision, lethargy and reports not feeling dizzy, shaky, or weak.   He has no concerns regarding his diabetes at this time.   He is not experiencing numbness or burning in feet, excessive thirst, blurry vision, weight changes or redness, sores or blisters on feet.           Headaches:   Since the patient's last clinic visit, headaches are: no change  The patient is getting headaches:  N/A  He is not able to do normal daily activities when he has a migraine.  The patient is taking the following rescue/relief medications:  No rescue/relief " "medications   Patient states \"I get no relief\" from the rescue/relief medications.   The patient is taking the following medications to prevent migraines:  No medications to prevent migraines  In the past 4 weeks, the patient has gone to an Urgent Care or Emergency Room 0 times times due to headaches.    He eats 2-3 servings of fruits and vegetables daily.He consumes 2 sweetened beverage(s) daily.He exercises with enough effort to increase his heart rate 30 to 60 minutes per day.  He exercises with enough effort to increase his heart rate 4 days per week. He is missing 1 dose(s) of medications per week.  He is not taking prescribed medications regularly due to other.        Construction work- installing trims/doors.  Going well.    Finally on stable medical insurance- [employer].    Just started this month, April '24.  He hasn't had stable insurance for several yrs.  He plans to try and establish care at [facility]- 3 blocks from his house.  He was seen at  there a couple weeks ago, and got refills of all his meds for a 3-month supply. They did recommend he check in here again at least once before transferring cares to [facility].    He has been mostly stay on medications despite the spotting insurance.  Taking them all consistently.  Labs have been more sporadic...  Last full set was on 12/8/23...  A1C was 8.2 (has ranged from 7's-10s the last few years).  LDL was 111, up from 79 in 3/21.  GFR has been great at >90.  LFTs electrolytes have been normal.    He had his last eye exam in 6/24, both eyes affected by moderate non-proliferative retinopathy.    Otherwise doing well other than getting some pain in cheeks when he chews harder things like steak. Both sides. Thinks its muscle pain rather than joint pain.  No other symptoms or triggers/patterns.        Review of Systems  Constitutional, neuro, ENT, endocrine, pulmonary, cardiac, gastrointestinal, genitourinary, musculoskeletal, integument and psychiatric systems " are negative, except as otherwise noted.      Objective       Vitals:  No vitals were obtained today due to virtual visit.    Physical Exam   GENERAL: alert and no distress  EYES: Eyes grossly normal to inspection.  No discharge or erythema, or obvious scleral/conjunctival abnormalities.  RESP: No audible wheeze, cough, or visible cyanosis.    SKIN: Visible skin clear. No significant rash, abnormal pigmentation or lesions.  NEURO: Cranial nerves grossly intact.  Mentation and speech appropriate for age.  PSYCH: Appropriate affect, tone, and pace of words      Transferred Records on 06/13/2024   Component Date Value Ref Range Status    RETINOPATHY 06/13/2024 UNKNOWN   Final     No results found for any visits on 04/09/25.      Video-Visit Details    Type of service:  Video Visit   Video End Time: 1:50pm  Originating Location (pt. Location): Other car    Distant Location (provider location):  On-site  Platform used for Video Visit: Tracy  Signed Electronically by: Mago Pantoja MD

## 2025-04-20 ENCOUNTER — HEALTH MAINTENANCE LETTER (OUTPATIENT)
Age: 50
End: 2025-04-20

## 2025-07-13 ENCOUNTER — HEALTH MAINTENANCE LETTER (OUTPATIENT)
Age: 50
End: 2025-07-13

## 2025-08-03 ENCOUNTER — HEALTH MAINTENANCE LETTER (OUTPATIENT)
Age: 50
End: 2025-08-03

## 2025-08-13 ENCOUNTER — LAB (OUTPATIENT)
Dept: LAB | Facility: CLINIC | Age: 50
End: 2025-08-13
Payer: MEDICAID

## 2025-08-13 DIAGNOSIS — E78.5 HYPERLIPIDEMIA LDL GOAL <100: ICD-10-CM

## 2025-08-13 DIAGNOSIS — I10 BENIGN ESSENTIAL HYPERTENSION: ICD-10-CM

## 2025-08-13 DIAGNOSIS — R51.9 PAIN OF CHEEK: ICD-10-CM

## 2025-08-13 DIAGNOSIS — E11.22 TYPE 2 DIABETES MELLITUS WITH STAGE 1 CHRONIC KIDNEY DISEASE, WITHOUT LONG-TERM CURRENT USE OF INSULIN (H): ICD-10-CM

## 2025-08-13 DIAGNOSIS — N18.1 TYPE 2 DIABETES MELLITUS WITH STAGE 1 CHRONIC KIDNEY DISEASE, WITHOUT LONG-TERM CURRENT USE OF INSULIN (H): ICD-10-CM

## 2025-08-13 LAB
ALBUMIN SERPL BCG-MCNC: 4.2 G/DL (ref 3.5–5.2)
ALP SERPL-CCNC: 78 U/L (ref 40–150)
ALT SERPL W P-5'-P-CCNC: 84 U/L (ref 0–70)
ANION GAP SERPL CALCULATED.3IONS-SCNC: 10 MMOL/L (ref 7–15)
AST SERPL W P-5'-P-CCNC: 56 U/L (ref 0–45)
BILIRUB SERPL-MCNC: 0.2 MG/DL
BUN SERPL-MCNC: 16.5 MG/DL (ref 6–20)
CALCIUM SERPL-MCNC: 9.7 MG/DL (ref 8.8–10.4)
CHLORIDE SERPL-SCNC: 102 MMOL/L (ref 98–107)
CHOLEST SERPL-MCNC: 124 MG/DL
CREAT SERPL-MCNC: 0.81 MG/DL (ref 0.67–1.17)
CREAT UR-MCNC: 170 MG/DL
EGFRCR SERPLBLD CKD-EPI 2021: >90 ML/MIN/1.73M2
EST. AVERAGE GLUCOSE BLD GHB EST-MCNC: 255 MG/DL
FASTING STATUS PATIENT QL REPORTED: YES
FASTING STATUS PATIENT QL REPORTED: YES
GLUCOSE SERPL-MCNC: 190 MG/DL (ref 70–99)
HBA1C MFR BLD: 10.5 % (ref 0–5.6)
HCO3 SERPL-SCNC: 27 MMOL/L (ref 22–29)
HDLC SERPL-MCNC: 28 MG/DL
LDLC SERPL CALC-MCNC: 72 MG/DL
MICROALBUMIN UR-MCNC: 25 MG/L
MICROALBUMIN/CREAT UR: 14.71 MG/G CR (ref 0–17)
NONHDLC SERPL-MCNC: 96 MG/DL
POTASSIUM SERPL-SCNC: 4.3 MMOL/L (ref 3.4–5.3)
PROT SERPL-MCNC: 7.3 G/DL (ref 6.4–8.3)
SODIUM SERPL-SCNC: 139 MMOL/L (ref 135–145)
TRIGL SERPL-MCNC: 122 MG/DL
TSH SERPL DL<=0.005 MIU/L-ACNC: 2.25 UIU/ML (ref 0.3–4.2)
VIT B12 SERPL-MCNC: 685 PG/ML (ref 232–1245)
VIT D+METAB SERPL-MCNC: 14 NG/ML (ref 20–50)

## 2025-08-13 PROCEDURE — 82306 VITAMIN D 25 HYDROXY: CPT

## 2025-08-13 PROCEDURE — 80053 COMPREHEN METABOLIC PANEL: CPT

## 2025-08-13 PROCEDURE — 80061 LIPID PANEL: CPT

## 2025-08-13 PROCEDURE — 36415 COLL VENOUS BLD VENIPUNCTURE: CPT

## 2025-08-13 PROCEDURE — 83036 HEMOGLOBIN GLYCOSYLATED A1C: CPT

## 2025-08-13 PROCEDURE — 82043 UR ALBUMIN QUANTITATIVE: CPT

## 2025-08-13 PROCEDURE — 84443 ASSAY THYROID STIM HORMONE: CPT

## 2025-08-13 PROCEDURE — 82570 ASSAY OF URINE CREATININE: CPT

## 2025-08-13 PROCEDURE — 82607 VITAMIN B-12: CPT

## 2025-08-19 ENCOUNTER — VIRTUAL VISIT (OUTPATIENT)
Dept: FAMILY MEDICINE | Facility: CLINIC | Age: 50
End: 2025-08-19
Payer: MEDICAID

## 2025-08-19 DIAGNOSIS — E78.5 HYPERLIPIDEMIA LDL GOAL <100: ICD-10-CM

## 2025-08-19 DIAGNOSIS — E11.22 TYPE 2 DIABETES MELLITUS WITH STAGE 1 CHRONIC KIDNEY DISEASE, WITHOUT LONG-TERM CURRENT USE OF INSULIN (H): Primary | ICD-10-CM

## 2025-08-19 DIAGNOSIS — K76.0 FATTY LIVER DISEASE, NONALCOHOLIC: ICD-10-CM

## 2025-08-19 DIAGNOSIS — R80.9 MICROALBUMINURIA: ICD-10-CM

## 2025-08-19 DIAGNOSIS — I10 BENIGN ESSENTIAL HYPERTENSION: ICD-10-CM

## 2025-08-19 DIAGNOSIS — N18.1 TYPE 2 DIABETES MELLITUS WITH STAGE 1 CHRONIC KIDNEY DISEASE, WITHOUT LONG-TERM CURRENT USE OF INSULIN (H): Primary | ICD-10-CM

## 2025-08-19 DIAGNOSIS — E55.9 VITAMIN D DEFICIENCY: ICD-10-CM

## 2025-08-19 PROCEDURE — 98006 SYNCH AUDIO-VIDEO EST MOD 30: CPT | Performed by: FAMILY MEDICINE

## 2025-08-19 RX ORDER — LISINOPRIL 10 MG/1
10 TABLET ORAL DAILY
Qty: 90 TABLET | Refills: 0 | Status: SHIPPED | OUTPATIENT
Start: 2025-08-19

## 2025-08-19 RX ORDER — VITAMIN B COMPLEX
25 TABLET ORAL DAILY
Qty: 90 TABLET | Refills: 3 | Status: SHIPPED | OUTPATIENT
Start: 2025-08-19

## 2025-08-19 RX ORDER — ATORVASTATIN CALCIUM 20 MG/1
20 TABLET, FILM COATED ORAL DAILY
Qty: 90 TABLET | Refills: 3 | Status: SHIPPED | OUTPATIENT
Start: 2025-08-19

## 2025-08-19 RX ORDER — METFORMIN HYDROCHLORIDE 500 MG/1
1000 TABLET, EXTENDED RELEASE ORAL 2 TIMES DAILY
Qty: 360 TABLET | Refills: 0 | Status: SHIPPED | OUTPATIENT
Start: 2025-08-19

## 2025-08-19 RX ORDER — GLIPIZIDE 10 MG/1
10 TABLET, FILM COATED, EXTENDED RELEASE ORAL DAILY
Qty: 90 TABLET | Refills: 0 | Status: SHIPPED | OUTPATIENT
Start: 2025-08-19

## 2025-08-28 ENCOUNTER — VIRTUAL VISIT (OUTPATIENT)
Dept: PHARMACY | Facility: CLINIC | Age: 50
End: 2025-08-28
Attending: FAMILY MEDICINE
Payer: MEDICAID

## 2025-08-28 DIAGNOSIS — N18.1 TYPE 2 DIABETES MELLITUS WITH STAGE 1 CHRONIC KIDNEY DISEASE, WITHOUT LONG-TERM CURRENT USE OF INSULIN (H): Primary | ICD-10-CM

## 2025-08-28 DIAGNOSIS — E11.22 TYPE 2 DIABETES MELLITUS WITH STAGE 1 CHRONIC KIDNEY DISEASE, WITHOUT LONG-TERM CURRENT USE OF INSULIN (H): Primary | ICD-10-CM

## 2025-08-28 RX ORDER — BLOOD-GLUCOSE METER
1 EACH MISCELLANEOUS
Qty: 1 KIT | Refills: 0 | Status: SHIPPED | OUTPATIENT
Start: 2025-08-28

## 2025-08-28 RX ORDER — ASPIRIN 81 MG/1
81 TABLET, CHEWABLE ORAL DAILY
Qty: 100 TABLET | Refills: 3 | Status: SHIPPED | OUTPATIENT
Start: 2025-08-28

## 2025-08-28 RX ORDER — LANCETS
1 EACH MISCELLANEOUS DAILY
Qty: 100 EACH | Refills: 3 | Status: SHIPPED | OUTPATIENT
Start: 2025-08-28

## (undated) RX ORDER — FENTANYL CITRATE 50 UG/ML
INJECTION, SOLUTION INTRAMUSCULAR; INTRAVENOUS
Status: DISPENSED
Start: 2024-06-13